# Patient Record
Sex: MALE | Race: WHITE | URBAN - METROPOLITAN AREA
[De-identification: names, ages, dates, MRNs, and addresses within clinical notes are randomized per-mention and may not be internally consistent; named-entity substitution may affect disease eponyms.]

---

## 2017-06-25 ENCOUNTER — ANESTHESIA - HEALTHEAST (OUTPATIENT)
Dept: SURGERY | Facility: CLINIC | Age: 22
End: 2017-06-25

## 2017-06-25 ENCOUNTER — SURGERY - HEALTHEAST (OUTPATIENT)
Dept: SURGERY | Facility: CLINIC | Age: 22
End: 2017-06-25

## 2017-06-25 ASSESSMENT — MIFFLIN-ST. JEOR
SCORE: 1803.19
SCORE: 1821.33

## 2017-07-09 ENCOUNTER — COMMUNICATION - HEALTHEAST (OUTPATIENT)
Dept: PEDIATRICS | Facility: CLINIC | Age: 22
End: 2017-07-09

## 2017-09-01 ENCOUNTER — AMBULATORY - HEALTHEAST (OUTPATIENT)
Dept: SURGERY | Facility: CLINIC | Age: 22
End: 2017-09-01

## 2017-09-13 ENCOUNTER — AMBULATORY - HEALTHEAST (OUTPATIENT)
Dept: SURGERY | Facility: CLINIC | Age: 22
End: 2017-09-13

## 2017-09-15 ENCOUNTER — RECORDS - HEALTHEAST (OUTPATIENT)
Dept: ADMINISTRATIVE | Facility: OTHER | Age: 22
End: 2017-09-15

## 2017-09-22 ENCOUNTER — OFFICE VISIT - HEALTHEAST (OUTPATIENT)
Dept: SURGERY | Facility: CLINIC | Age: 22
End: 2017-09-22

## 2017-09-22 DIAGNOSIS — M89.9 PUBIC BONE PAIN: ICD-10-CM

## 2017-09-22 DIAGNOSIS — R10.31 RIGHT LOWER QUADRANT ABDOMINAL PAIN: ICD-10-CM

## 2017-09-22 ASSESSMENT — MIFFLIN-ST. JEOR: SCORE: 1796.38

## 2017-09-28 ENCOUNTER — RECORDS - HEALTHEAST (OUTPATIENT)
Dept: ADMINISTRATIVE | Facility: OTHER | Age: 22
End: 2017-09-28

## 2017-10-02 ENCOUNTER — RECORDS - HEALTHEAST (OUTPATIENT)
Dept: ADMINISTRATIVE | Facility: OTHER | Age: 22
End: 2017-10-02

## 2017-10-02 ENCOUNTER — AMBULATORY - HEALTHEAST (OUTPATIENT)
Dept: SURGERY | Facility: CLINIC | Age: 22
End: 2017-10-02

## 2017-10-02 DIAGNOSIS — M54.50 CHRONIC LOW BACK PAIN WITHOUT SCIATICA, UNSPECIFIED BACK PAIN LATERALITY: ICD-10-CM

## 2017-10-02 DIAGNOSIS — G89.29 CHRONIC LOW BACK PAIN WITHOUT SCIATICA, UNSPECIFIED BACK PAIN LATERALITY: ICD-10-CM

## 2017-10-10 ENCOUNTER — RECORDS - HEALTHEAST (OUTPATIENT)
Dept: ADMINISTRATIVE | Facility: OTHER | Age: 22
End: 2017-10-10

## 2017-10-31 ENCOUNTER — RECORDS - HEALTHEAST (OUTPATIENT)
Dept: ADMINISTRATIVE | Facility: OTHER | Age: 22
End: 2017-10-31

## 2018-11-01 ENCOUNTER — TELEPHONE (OUTPATIENT)
Dept: GASTROENTEROLOGY | Facility: CLINIC | Age: 23
End: 2018-11-01

## 2018-11-02 NOTE — TELEPHONE ENCOUNTER
FUTURE VISIT INFORMATION      FUTURE VISIT INFORMATION:    Date: 11/5/18    Time:     [place] [facility] [place] INFORMATION:    Referring provider:  N/A    Referring providers clinic:  [facility]    Reason for visit/diagnosis: Diverticulitis    RECORDS REQUESTED FROM:         NOTES STATUS DETAILS   OFFICE NOTE from referring provider Care Everywhere    OFFICE NOTE from other specialist N/A    DISCHARGE SUMMARY from hospital Care Everywhere    OPERATIVE REPORT N/A    MEDICATION LIST Care Everywhere         ENDOSCOPY  N/A    COLONOSCOPY N/A    ERCP N/A    EUS N/A    STOOL TESTING N/A    PERTINENT LABS Care Everywhere    PATHOLOGY REPORTS (RELATED) Care Everywhere    IMAGING (CT, MRI, EGD) Care Everywhere

## 2018-11-05 ENCOUNTER — OFFICE VISIT (OUTPATIENT)
Dept: GASTROENTEROLOGY | Facility: CLINIC | Age: 23
End: 2018-11-05
Payer: COMMERCIAL

## 2018-11-05 ENCOUNTER — PRE VISIT (OUTPATIENT)
Dept: GASTROENTEROLOGY | Facility: CLINIC | Age: 23
End: 2018-11-05

## 2018-11-05 ENCOUNTER — TELEPHONE (OUTPATIENT)
Dept: GASTROENTEROLOGY | Facility: CLINIC | Age: 23
End: 2018-11-05

## 2018-11-05 VITALS
SYSTOLIC BLOOD PRESSURE: 142 MMHG | OXYGEN SATURATION: 99 % | WEIGHT: 165.5 LBS | DIASTOLIC BLOOD PRESSURE: 79 MMHG | TEMPERATURE: 97.7 F | BODY MASS INDEX: 21.24 KG/M2 | HEART RATE: 106 BPM | HEIGHT: 74 IN

## 2018-11-05 DIAGNOSIS — M89.9 PUBIC BONE PAIN: Primary | ICD-10-CM

## 2018-11-05 DIAGNOSIS — M89.9 PUBIC BONE PAIN: ICD-10-CM

## 2018-11-05 LAB
ALBUMIN SERPL-MCNC: 5.3 G/DL (ref 3.4–5)
ALBUMIN UR-MCNC: NEGATIVE MG/DL
ALP SERPL-CCNC: 54 U/L (ref 40–150)
ALT SERPL W P-5'-P-CCNC: 32 U/L (ref 0–70)
ANION GAP SERPL CALCULATED.3IONS-SCNC: 10 MMOL/L (ref 3–14)
APPEARANCE UR: CLEAR
AST SERPL W P-5'-P-CCNC: 19 U/L (ref 0–45)
BASOPHILS # BLD AUTO: 0 10E9/L (ref 0–0.2)
BASOPHILS NFR BLD AUTO: 0.2 %
BILIRUB SERPL-MCNC: 0.8 MG/DL (ref 0.2–1.3)
BILIRUB UR QL STRIP: NEGATIVE
BUN SERPL-MCNC: 14 MG/DL (ref 7–30)
CALCIUM SERPL-MCNC: 10 MG/DL (ref 8.5–10.1)
CHLORIDE SERPL-SCNC: 103 MMOL/L (ref 94–109)
CO2 SERPL-SCNC: 27 MMOL/L (ref 20–32)
COLOR UR AUTO: YELLOW
CREAT SERPL-MCNC: 0.94 MG/DL (ref 0.66–1.25)
CRP SERPL-MCNC: <2.9 MG/L (ref 0–8)
DIFFERENTIAL METHOD BLD: NORMAL
EOSINOPHIL # BLD AUTO: 0.1 10E9/L (ref 0–0.7)
EOSINOPHIL NFR BLD AUTO: 0.6 %
ERYTHROCYTE [DISTWIDTH] IN BLOOD BY AUTOMATED COUNT: 12.4 % (ref 10–15)
ERYTHROCYTE [SEDIMENTATION RATE] IN BLOOD BY WESTERGREN METHOD: 1 MM/H (ref 0–15)
GFR SERPL CREATININE-BSD FRML MDRD: >90 ML/MIN/1.7M2
GLUCOSE SERPL-MCNC: 84 MG/DL (ref 70–99)
GLUCOSE UR STRIP-MCNC: NEGATIVE MG/DL
HCT VFR BLD AUTO: 50.5 % (ref 40–53)
HGB BLD-MCNC: 17.2 G/DL (ref 13.3–17.7)
HGB UR QL STRIP: NEGATIVE
IMM GRANULOCYTES # BLD: 0 10E9/L (ref 0–0.4)
IMM GRANULOCYTES NFR BLD: 0.3 %
KETONES UR STRIP-MCNC: 20 MG/DL
LEUKOCYTE ESTERASE UR QL STRIP: NEGATIVE
LYMPHOCYTES # BLD AUTO: 1.5 10E9/L (ref 0.8–5.3)
LYMPHOCYTES NFR BLD AUTO: 15.4 %
MCH RBC QN AUTO: 30.1 PG (ref 26.5–33)
MCHC RBC AUTO-ENTMCNC: 34.1 G/DL (ref 31.5–36.5)
MCV RBC AUTO: 88 FL (ref 78–100)
MONOCYTES # BLD AUTO: 0.6 10E9/L (ref 0–1.3)
MONOCYTES NFR BLD AUTO: 6 %
MUCOUS THREADS #/AREA URNS LPF: PRESENT /LPF
NEUTROPHILS # BLD AUTO: 7.3 10E9/L (ref 1.6–8.3)
NEUTROPHILS NFR BLD AUTO: 77.5 %
NITRATE UR QL: NEGATIVE
NRBC # BLD AUTO: 0 10*3/UL
NRBC BLD AUTO-RTO: 0 /100
PH UR STRIP: 7 PH (ref 5–7)
PLATELET # BLD AUTO: 239 10E9/L (ref 150–450)
POTASSIUM SERPL-SCNC: 4.2 MMOL/L (ref 3.4–5.3)
PROT SERPL-MCNC: 8.8 G/DL (ref 6.8–8.8)
RBC # BLD AUTO: 5.71 10E12/L (ref 4.4–5.9)
RBC #/AREA URNS AUTO: <1 /HPF (ref 0–2)
SODIUM SERPL-SCNC: 139 MMOL/L (ref 133–144)
SOURCE: ABNORMAL
SP GR UR STRIP: 1.01 (ref 1–1.03)
UROBILINOGEN UR STRIP-MCNC: 0 MG/DL (ref 0–2)
WBC # BLD AUTO: 9.4 10E9/L (ref 4–11)
WBC #/AREA URNS AUTO: 1 /HPF (ref 0–5)

## 2018-11-05 ASSESSMENT — ENCOUNTER SYMPTOMS
WEIGHT GAIN: 0
WEIGHT LOSS: 1
ABDOMINAL PAIN: 1
LIGHT-HEADEDNESS: 0
ORTHOPNEA: 0
PANIC: 0
DEPRESSION: 1
NAUSEA: 0
HEARTBURN: 0
CONSTIPATION: 0
BLOOD IN STOOL: 0
INSOMNIA: 0
PALPITATIONS: 1
DIFFICULTY URINATING: 0
FLANK PAIN: 0
BOWEL INCONTINENCE: 0
HEMATURIA: 0
INCREASED ENERGY: 1
DIARRHEA: 0
DECREASED CONCENTRATION: 1
VOMITING: 0
RECTAL PAIN: 0
EXERCISE INTOLERANCE: 1
POLYDIPSIA: 0
SYNCOPE: 0
FATIGUE: 1
FEVER: 0
CHILLS: 0
DYSURIA: 1
NERVOUS/ANXIOUS: 1
POLYPHAGIA: 0
HALLUCINATIONS: 0
HYPOTENSION: 0
LEG PAIN: 0
NIGHT SWEATS: 1
ALTERED TEMPERATURE REGULATION: 0
BLOATING: 1
HYPERTENSION: 0
DECREASED APPETITE: 1
JAUNDICE: 0
SLEEP DISTURBANCES DUE TO BREATHING: 0

## 2018-11-05 ASSESSMENT — PAIN SCALES - GENERAL: PAINLEVEL: NO PAIN (0)

## 2018-11-05 NOTE — LETTER
Date:November 9, 2018      Patient was self referred, no letter generated. Do not send.        [facility] Information

## 2018-11-05 NOTE — LETTER
"11/5/2018       RE: Delvis Owusu  [address] [facility] [id number]     Dear Colleague,    Thank you for referring your patient, Delvis Owusu, to the [facility] GASTROENTEROLOGY AND IBD CLINIC at [facility]. Please see a copy of my visit note below.    GI CLINIC VISIT - NEW PATIENT    CC/REFERRING PROVIDER: Referred Self  REASON FOR CONSULTATION: Pubic symphysis pain    HPI: 23 year old male with PMH of appendicitis s/p appendectomy 6/2017, cecal inflammation of unclear etiology in 2017 (? Related to appendicitis vs diverticulitis) with reportedly normal colonoscopy 10/2017 (no records available) presenting for pain in pubic symphysis region.     He initially presented to [facility] in 6/2017 with R-sided abdominal pain. CT revealed cecal inflammation and possible appendicitis, so he underwent appendectomy 6/2017. Operative note indicated: \"inflammatory cecal mass which appeared like infectious etiology and not malignancy. The appendix was adhered to this and the tip was nearly fused to this mass proximally.\" and \"I once again evaluated the cecal mass and it appeared like an infectious mass almost as though something at perforated but sealed itself. It was unclear whether or not the appendix because this or vice versa.\" Post-operatively, he noted basically immediately after surgery he had a pressure sensation/discomfort over his pubic symphysis that has persistent since then for the past 1.5 years. In follow-up after surgery, he had recurrent hospitalizations for RLQ abdominal pain:   7/2017-CT with cecal inflammation and enlarged LNs  9/2017-CT with ascending colon diverticulitis  Following this, he had MRI pelvis 9/2017 which showed resolved pelvic free fluid and did not fully image the R colon inflammation. He says that he subsequently had colon 10/2017 by a surgeon at [facility] that he says was normal, however we do not have these " "records.     Regarding his current symptoms, he notes pain over the pubic symphysis since right after his surgery for the past 1.5 years. He says it is a \"pressure\" that is constant at a 2/10 and will flare to 5/10. It is worse with prolonged sitting and exercise. Notes it occasionally will radiate to inner thighs or testicles with prolonged sitting. No alleviating factors. He did PT for 4-5 weeks which didn't help, and tried OTC pain medications which didn't help. He hasn't seen anyone in the past year for evaluation of this. He also has rare LLQ and RLQ \"colon pains\" which occur up to 1x/day, are sharp, last from 5 mins up to 1 hour, and are worse with urination. He otherwise denies urinary symptoms (no burning, discharge, blood), no stool changes (1-2 formed brown BMs per day), N/V, fevers/chills, dys/odynophagia. Has lost 8-10 lbs in past year due to poor appetite.     ROS: 10pt ROS performed and otherwise negative.    PERTINENT PAST MEDICAL HISTORY:  As noted above.    PREVIOUS ABDOMINAL/GYNECOLOGIC SURGERIES:  As noted above.    PREVIOUS ENDOSCOPY:  Colon 10/2017 - reportedly normal, no records     PERTINENT MEDICATIONS:  None  Medications reviewed with patient today, see Medication List/Assessment for details.  No other NSAID/anticoagulation reported by patient.  No other OTC/herbal/supplements reported by patient.    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     FAMILY HISTORY:  No h/o colon cancer or IBD    PHYSICAL EXAMINATION:  Vitals reviewed  /79  Pulse 106  Temp 97.7  F (36.5  C) (Oral)  Ht 1.88 m (6' 2\")  Wt 75.1 kg (165 lb 8 oz)  SpO2 99%  BMI 21.25 kg/m2    Gen: aaox3, cooperative, pleasant, not diaphoretic, nad  HEENT: ncat, neck supple, no clad/sclad, normal op w/o ulcer/exudate, anicteric, mmm  Resp/CV without acute " findings, not dyspneic/tachycardic  Abd: soft, ND. Tender over pubic symphysis, worse with abdominal contraction (+Carnetts), near laparoscopic port site. Other port sites well-healed.   : No testicular masses or tenderness  Ext: no c/c/e  Skin: warm, perfused, no jaundice  Neuro: grossly intact    PERTINENT STUDIES Reviewed in EMR     ASSESSMENT/PLAN: 23 year old male with PMH of appendicitis s/p appendectomy 6/2017, cecal inflammation of unclear etiology in 2017 (? Related to appendicitis vs diverticulitis) with reportedly normal colonoscopy 10/2017 (no records available) presenting for pain in pubic symphysis region. Overall, the pain seems most likely related to appendectomy given started after surgery and no history of pain prior to this. Consider nerve injury from laparoscopic port, hernia 2/2 port site, or abdominal wall injury given worsening pain with exercise and abdominal flexion. He does have history of cecal inflammation with reportedly normal colon in 2017, however has intermittent bilateral lower quadrant pain without stool changes, so Crohn's disease also possible but less likely. Consider  cause as well given location of pain (cystitis, STD).   - CT abdm/pelvis to evaluate for hernia at port site or abdominal wall injury or bowel inflammation  - Repeat colonoscopy to evaluate for IBD, with specific focus on cecum/TI  - Check labs today - CBC, CMP, ESR, CRP  - Refer to pain clinic to be seen after CT/Colon to evaluate for trigger point injections for possible nerve injury from port site  - Check U/A and GC/Chlamydia, consider referral to urology if no source of pain identified with above workup    RTC 3 months, sooner if symptomatic.      Thank you for this consultation. It was a pleasure to participate in the care of this patient; please contact us with any further questions.    Discussed with Dr. Mamadou Mckeon MD  GI Fellow  p [phone]      ATTENDING ATTESTATION:     DATE SEEN:  11/5/2018    Patient was discussed, seen, and examined by me, Osmar Cedillo. The plan of care and pertinent data/imaging were also reviewed with the GI Fellow, Dr. Mckeon. Agree with the assessment and plan as delineated above.    Please contact me with any further questions.    Osmar Cedillo MD    [facility]  Division of Gastroenterology, Hepatology and Nutrition          Again, thank you for allowing me to participate in the care of your patient.      Sincerely,    José Luis Mckeon MD

## 2018-11-05 NOTE — MR AVS SNAPSHOT
After Visit Summary   11/5/2018    Delvis Owusu    MRN: [id number]           Patient Information     Date Of Birth          1995        Visit Information        Provider Department      11/5/2018 9:00 AM José Luis Mckeon MD [facility] Gastroenterology and IBD Clinic        Today's Diagnoses     Pubic bone pain    -  1      Care Instructions    - CT to evaluate for hernia or bowel inflammation  You are scheduled on  November 6   Check in time 1110   Nothing to eat or drink for 2 hours prior to exam    [facility]   First floor imaging     - Schedule colonoscopy  You are scheduled on November 21   Check in time 830   [facility] [facility]   [address] [facility]    You will be mailed the instructions  You will receive a call from a pre assessment nurse about one week    - Pain clinic after CT and colonoscopy      - Labs and urine today  Lab tests today at the 1st floor -- you will be notified of results by letter or my chart message in 7-10 days.  You will receive a phone call if more urgent follow up is needed.    - Return in 3 months   You will receive a call from Kaylynn chirinos  to set up appt with Herlinda Stoll     For questions regarding your care Monday through Friday, contact the RN GI care coordinator,  Call   [phone] . Your call will be  returned same day, or if consultation is needed with the provider, it may be following business day - or you may send a My Chart message.    For medication refills (prescribed by the GI clinic), contact your pharmacy.    For appointment rescheduling/cancellation, contact [phone]     After hours, or if you have an immediate GI concern and cannot wait for a return call, contact the GI Fellow at [phone] and select option #4.        For questions regarding your care Monday through Friday, contact the RN GI care coordinator,  Call   [phone] . Your call will be  returned same day, or if consultation is needed  with the provider, it may be following business day - or you may send a My Chart message.    For medication refills (prescribed by the GI clinic), contact your pharmacy.    For appointment rescheduling/cancellation, contact [phone]     After hours, or if you have an immediate GI concern and cannot wait for a return call, contact the GI Fellow at [phone] and select option #4.              Follow-ups after your visit        Additional Services     GASTROENTEROLOGY ADULT REF PROCEDURE ONLY [facility] [phone]       Last Lab Result: No results found for: CR  Body mass index is 21.25 kg/(m^2).     Needed:  No  Language:  English    Patient will be contacted to schedule procedure.     Please be aware that coverage of these services is subject to the terms and limitations of your health insurance plan.  Call member services at your health plan with any benefit or coverage questions.  Any procedures must be performed at a [place] facility OR coordinated by your clinic's referral office.    Please bring the following with you to your appointment:    (1) Any X-Rays, CTs or MRIs which have been performed.  Contact the facility where they were done to arrange for  prior to your scheduled appointment.    (2) List of current medications   (3) This referral request   (4) Any documents/labs given to you for this referral            [facility]FERRAL       Your provider has referred you to: [facility], located on the 4th Floor of the [facility] Please call [phone] to make an appointment.     Clinic is located at:   [facility] [address] [address]      Please complete the following questions:    Procedure/Referral: Pubic symphysis pain s/p appendectomy, potentially nerve injury or hernia    What is your diagnosis for the patient's pain? Abdominal wall pain s/p  appendectomy    What are your specific questions for the pain specialist? Would he be a candidate for trigger point injections?    Are there any red flags that may impact the assessment or management of the patient? None    REGARDING OPIOID MEDICATIONS:  The discussion of opioids management, appropriateness of therapy, and dosing will be discussed in patients being seen for evaluation.  The pain management clinics are not long-term prescribing clinics, with transition of prescribing of medications ultimately going back to the referring provider/PCP.  If prescribing is taken over at the pain clinic, it is in actively involved patients whom are appropriate for opioids, urine drug screening is completed, and long-term prescribing plan has been determined.  Therefore, we will not be automatically taking over prescribing at the patient's first visit.  Is this agreeable to you? agrees.    Please be aware that coverage of these services is subject to the terms and limitations of your health insurance plan.  Call member services at your health plan with any benefit or coverage questions.      Please bring the following with you to your appointment or have sent to the [facility]:    (1) Any X-Rays, CTs or MRIs which have been performed that are not in Epic.  Contact the facility where they were done to arrange for  prior to your scheduled appointment.  Any new CT, MRI or other procedures ordered by your specialist must be performed at a [facility] facility or coordinated by your clinic's referral office.    (2) List of current medications   (3) This referral request   (4) Any documents/labs given to you for this referral                  Follow-up notes from your care team     Return in about 3 months (around 2/5/2019).      Your next 10 appointments already scheduled     Nov 06, 2018 11:40 AM CST   CT ABDOMEN PELVIS W CONTRAST with UCCT2   [facility] ([facility])    [address]  [address] [facility] [address] [phone]           How do I prepare for my exam? (Food and drink instructions) To prepare: Do not eat or drink for 2 hours before your exam. If you need to take medicine, you may take it with small sips of water. (We may ask you to take liquid medicine as well.)  How do I prepare for my exam? (Other instructions) Please arrive 30 minutes early for your CT.  Once in the department you might be asked to drink water 15-20 minutes prior to your exam.  If indicated you may be asked to drink an oral contrast in advance of your CT.  If this is the case, the imaging team will let you know or be in contact with you prior to your appointment  Patients over 70 or patients with diabetes or kidney problems: If you haven t had a blood test (creatinine test) within the last 30 days, the Cardiologist/Radiologist may require you to get this test prior to your exam.  If you have diabetes:  Continue to take your metformin medication on the day of your exam  What should I wear: Please wear loose clothing, such as a sweat suit or jogging clothes. Avoid snaps, zippers and other metal. We may ask you to undress and put on a hospital gown.  How long does the exam take: Most scans take less than 20 minutes.  What should I bring: Please bring any scans or X-rays taken at other hospitals, if similar tests were done. Also bring a list of your medicines, including vitamins, minerals and over-the-counter drugs. It is safest to leave personal items at home.  Do I need a : No  is needed.  What do I need to tell my doctor? Be sure to tell your doctor: * If you have any allergies. * If there s any chance you are pregnant. * If you are breastfeeding.  What should I do after the exam: No restrictions, You may resume normal activities.  What is this test: A CT (computed tomography) scan is a series of pictures that allows us to look inside your body. The scanner creates images of the  body in cross sections, much like slices of bread. This helps us see any problems more clearly. You may receive contrast (X-ray dye) before or during your scan. You will be asked to drink the contrast.  Who should I call with questions: If you have any questions, please call the Imaging Department where you will have your exam. Directions, parking instructions, and other information is available on our website, [email or web address].            Nov 21, 2018  9:30 AM CST   Colonoscopy with Osmar Cedillo MD   [facility] ([facility])    [address] [place] [address] [address]  [facility] [id number] [phone]              Future tests that were ordered for you today     Open Future Orders        Priority Expected Expires Ordered    CRP inflammation Routine  11/5/2019 11/5/2018    Routine UA with micro reflex to culture Routine  11/5/2019 11/5/2018    Neisseria gonorrhoeae PCR Routine  11/6/2019 11/5/2018    Chlamydia trachomatis PCR Routine  11/6/2019 11/5/2018    CT Abdomen pelvis w contrast* Routine  11/5/2019 11/5/2018    CBC with platelets differential Routine  11/5/2019 11/5/2018    Comprehensive metabolic panel Routine  11/5/2019 11/5/2018    Erythrocyte sedimentation rate auto Routine  11/5/2019 11/5/2018            Who to contact     Please call your clinic at [phone] to:    Ask questions about your health    Make or cancel appointments    Discuss your medicines    Learn about your test results    Speak to your doctor            Additional Information About Your Visit        MyChart Information     [employer] is an electronic gateway that provides easy, online access to your medical records. With [employer], you can request a clinic appointment, read your test results, renew a prescription or communicate with your care team.     To sign up for [employer] visit the website at [email or web address]   You will be asked to enter the access code listed below, as well as  "some personal information. Please follow the directions to create your username and password.     Your access code is: [email or web address]  Expires: 2019  5:31 AM     Your access code will  in 90 days. If you need help or a new code, please contact your [facility] or call [phone] for assistance.        Care EveryWhere ID     This is your Care EveryWhere ID. This could be used by other organizations to access your [facility] medical records  [id number]        Your Vitals Were     Pulse Temperature Height Pulse Oximetry BMI (Body Mass Index)       106 97.7  F (36.5  C) (Oral) 1.88 m (6' 2\") 99% 21.25 kg/m2        Blood Pressure from Last 3 Encounters:   18 142/79    Weight from Last 3 Encounters:   18 75.1 kg (165 lb 8 oz)              We Performed the Following     GASTROENTEROLOGY ADULT REF PROCEDURE ONLY [facility] [phone]     [facility] REFERRAL        Primary Care Provider    None Specified       No primary provider on file.        Equal Access to Services     [facility]: Hadii amara diaz Sokirstie, wamickeyda lumegan, qaybta kaalmaterell mojica, mikayla sanchez . So [facility] [phone].    ATENCIÓN: Si habla español, tiene a mcgregor disposición servicios gratuitos de asistencia lingüística. Adrian al [phone].    We comply with applicable federal civil rights laws and [place] laws. We do not discriminate on the basis of race, color, national origin, age, disability, sex, sexual orientation, or gender identity.            Thank you!     Thank you for choosing [facility] GASTROENTEROLOGY AND IBD CLINIC  for your care. Our goal is always to provide you with excellent care. Hearing back from our patients is one way we can continue to improve our services. Please take a few minutes to complete the written survey that you may receive in the mail after your visit with us. Thank you!             Your " Updated Medication List - Protect others around you: Learn how to safely use, store and throw away your medicines at www.disposemymeds.org.      Notice  As of 11/5/2018 10:05 AM    You have not been prescribed any medications.

## 2018-11-05 NOTE — PROGRESS NOTES
"GI CLINIC VISIT - NEW PATIENT    CC/REFERRING PROVIDER: Referred Self  REASON FOR CONSULTATION: Pubic symphysis pain    HPI: 23 year old male with PMH of appendicitis s/p appendectomy 6/2017, cecal inflammation of unclear etiology in 2017 (? Related to appendicitis vs diverticulitis) with reportedly normal colonoscopy 10/2017 (no records available) presenting for pain in pubic symphysis region.     He initially presented to [facility] in 6/2017 with R-sided abdominal pain. CT revealed cecal inflammation and possible appendicitis, so he underwent appendectomy 6/2017. Operative note indicated: \"inflammatory cecal mass which appeared like infectious etiology and not malignancy. The appendix was adhered to this and the tip was nearly fused to this mass proximally.\" and \"I once again evaluated the cecal mass and it appeared like an infectious mass almost as though something at perforated but sealed itself. It was unclear whether or not the appendix because this or vice versa.\" Post-operatively, he noted basically immediately after surgery he had a pressure sensation/discomfort over his pubic symphysis that has persistent since then for the past 1.5 years. In follow-up after surgery, he had recurrent hospitalizations for RLQ abdominal pain:   7/2017-CT with cecal inflammation and enlarged LNs  9/2017-CT with ascending colon diverticulitis  Following this, he had MRI pelvis 9/2017 which showed resolved pelvic free fluid and did not fully image the R colon inflammation. He says that he subsequently had colon 10/2017 by a surgeon at [facility] that he says was normal, however we do not have these records.     Regarding his current symptoms, he notes pain over the pubic symphysis since right after his surgery for the past 1.5 years. He says it is a \"pressure\" that is constant at a 2/10 and will flare to 5/10. It is worse with prolonged sitting and exercise. Notes it occasionally will radiate to inner thighs or testicles with " "prolonged sitting. No alleviating factors. He did PT for 4-5 weeks which didn't help, and tried OTC pain medications which didn't help. He hasn't seen anyone in the past year for evaluation of this. He also has rare LLQ and RLQ \"colon pains\" which occur up to 1x/day, are sharp, last from 5 mins up to 1 hour, and are worse with urination. He otherwise denies urinary symptoms (no burning, discharge, blood), no stool changes (1-2 formed brown BMs per day), N/V, fevers/chills, dys/odynophagia. Has lost 8-10 lbs in past year due to poor appetite.     ROS: 10pt ROS performed and otherwise negative.    PERTINENT PAST MEDICAL HISTORY:  As noted above.    PREVIOUS ABDOMINAL/GYNECOLOGIC SURGERIES:  As noted above.    PREVIOUS ENDOSCOPY:  Colon 10/2017 - reportedly normal, no records     PERTINENT MEDICATIONS:  None  Medications reviewed with patient today, see Medication List/Assessment for details.  No other NSAID/anticoagulation reported by patient.  No other OTC/herbal/supplements reported by patient.    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     FAMILY HISTORY:  No h/o colon cancer or IBD    PHYSICAL EXAMINATION:  Vitals reviewed  /79  Pulse 106  Temp 97.7  F (36.5  C) (Oral)  Ht 1.88 m (6' 2\")  Wt 75.1 kg (165 lb 8 oz)  SpO2 99%  BMI 21.25 kg/m2    Gen: aaox3, cooperative, pleasant, not diaphoretic, nad  HEENT: ncat, neck supple, no clad/sclad, normal op w/o ulcer/exudate, anicteric, mmm  Resp/CV without acute findings, not dyspneic/tachycardic  Abd: soft, ND. Tender over pubic symphysis, worse with abdominal contraction (+Carnetts), near laparoscopic port site. Other port sites well-healed.   : No testicular masses or tenderness  Ext: no c/c/e  Skin: warm, perfused, no jaundice  Neuro: grossly intact    PERTINENT STUDIES Reviewed in EMR "     ASSESSMENT/PLAN: 23 year old male with PMH of appendicitis s/p appendectomy 6/2017, cecal inflammation of unclear etiology in 2017 (? Related to appendicitis vs diverticulitis) with reportedly normal colonoscopy 10/2017 (no records available) presenting for pain in pubic symphysis region. Overall, the pain seems most likely related to appendectomy given started after surgery and no history of pain prior to this. Consider nerve injury from laparoscopic port, hernia 2/2 port site, or abdominal wall injury given worsening pain with exercise and abdominal flexion. He does have history of cecal inflammation with reportedly normal colon in 2017, however has intermittent bilateral lower quadrant pain without stool changes, so Crohn's disease also possible but less likely. Consider  cause as well given location of pain (cystitis, STD).   - CT abdm/pelvis to evaluate for hernia at port site or abdominal wall injury or bowel inflammation  - Repeat colonoscopy to evaluate for IBD, with specific focus on cecum/TI  - Check labs today - CBC, CMP, ESR, CRP  - Refer to pain clinic to be seen after CT/Colon to evaluate for trigger point injections for possible nerve injury from port site  - Check U/A and GC/Chlamydia, consider referral to urology if no source of pain identified with above workup    RTC 3 months, sooner if symptomatic.      Thank you for this consultation. It was a pleasure to participate in the care of this patient; please contact us with any further questions.    Discussed with Dr. Mamadou Mckeon MD  GI Fellow  p [phone]      ATTENDING ATTESTATION:     DATE SEEN: 11/5/2018    Patient was discussed, seen, and examined by me, Osmar Cedillo. The plan of care and pertinent data/imaging were also reviewed with the GI Fellow, Dr. Mckeon. Agree with the assessment and plan as delineated above.    Please contact me with any further questions.    Osmar Cedillo MD    [facility]  [place]  Division of Gastroenterology, Hepatology and Nutrition

## 2018-11-05 NOTE — PATIENT INSTRUCTIONS
- CT to evaluate for hernia or bowel inflammation  You are scheduled on  November 6   Check in time 1110   Nothing to eat or drink for 2 hours prior to exam    [facility]   First floor imaging     - Schedule colonoscopy  You are scheduled on November 21   Check in time 830   [facility] [facility]   [facility]    You will be mailed the instructions  You will receive a call from a pre assessment nurse about one week    - Pain clinic after CT and colonoscopy      - Labs and urine today  Lab tests today at the 1st floor -- you will be notified of results by letter or my chart message in 7-10 days.  You will receive a phone call if more urgent follow up is needed.    - Return in 3 months   You will receive a call from Kaylynn chirinos  to set up appt with Herlinda Stoll     For questions regarding your care Monday through Friday, contact the RN GI care coordinator,  Call   [phone] . Your call will be  returned same day, or if consultation is needed with the provider, it may be following business day - or you may send a My Chart message.    For medication refills (prescribed by the GI clinic), contact your pharmacy.    For appointment rescheduling/cancellation, contact [phone]     After hours, or if you have an immediate GI concern and cannot wait for a return call, contact the GI Fellow at [phone] and select option #4.        For questions regarding your care Monday through Friday, contact the RN GI care coordinator,  Call   [phone] . Your call will be  returned same day, or if consultation is needed with the provider, it may be following business day - or you may send a My Chart message.    For medication refills (prescribed by the GI clinic), contact your pharmacy.    For appointment rescheduling/cancellation, contact [phone]     After hours, or if you have an immediate GI concern and cannot wait for a return call, contact the GI Fellow at [phone] and select option  #4.

## 2018-11-06 ENCOUNTER — RADIANT APPOINTMENT (OUTPATIENT)
Dept: CT IMAGING | Facility: CLINIC | Age: 23
End: 2018-11-06
Attending: INTERNAL MEDICINE
Payer: COMMERCIAL

## 2018-11-06 ENCOUNTER — TELEPHONE (OUTPATIENT)
Dept: GASTROENTEROLOGY | Facility: CLINIC | Age: 23
End: 2018-11-06

## 2018-11-06 DIAGNOSIS — M89.9 PUBIC BONE PAIN: ICD-10-CM

## 2018-11-06 LAB
C TRACH DNA SPEC QL NAA+PROBE: NEGATIVE
N GONORRHOEA DNA SPEC QL NAA+PROBE: NEGATIVE
SPECIMEN SOURCE: NORMAL
SPECIMEN SOURCE: NORMAL

## 2018-11-06 RX ORDER — IOPAMIDOL 755 MG/ML
101 INJECTION, SOLUTION INTRAVASCULAR ONCE
Status: COMPLETED | OUTPATIENT
Start: 2018-11-06 | End: 2018-11-06

## 2018-11-06 RX ADMIN — IOPAMIDOL 101 ML: 755 INJECTION, SOLUTION INTRAVASCULAR at 11:47

## 2018-11-06 NOTE — TELEPHONE ENCOUNTER
[place] [facility]    Phone Message    May a detailed message be left on voicemail: yes    Reason for Call: Other: Patient is calling to see if the Dr would see him after his CT. I advised the patient as far as I know the Dr will contact you with the results but he has some other questions as well. He stated he saw  but I also saw  on his chart. The  patient's CT is for 11/6. Please follow up with the patient. Thank you.    Action Taken: Message routed to:  [facility] FREDDIE

## 2018-11-06 NOTE — DISCHARGE INSTRUCTIONS

## 2018-11-06 NOTE — TELEPHONE ENCOUNTER
Pt stopped in clinic.   Discussed with Dr. Cedillo who is Dr. Mckeon's preceptor . Pt just had ct today need results. Then colonoscopy and results then will work on pain clinic referral.  That it is a step by step approach.  Informed pt that Dr. Cedillo will call pt with results.

## 2018-11-07 ENCOUNTER — TELEPHONE (OUTPATIENT)
Dept: GASTROENTEROLOGY | Facility: CLINIC | Age: 23
End: 2018-11-07

## 2018-11-07 NOTE — TELEPHONE ENCOUNTER
Patient called with questions regarding office visit from 11/5/18. Went over after visit summary patient had with him and plan for colonoscopy which is scheduled for 11/21/18, and that provider will go over scope once completed and decide next plan of care then. Patient stated understanding an thankful for assistance.

## 2018-11-08 ENCOUNTER — PATIENT OUTREACH (OUTPATIENT)
Dept: GASTROENTEROLOGY | Facility: CLINIC | Age: 23
End: 2018-11-08

## 2018-11-08 DIAGNOSIS — M89.9 PUBIC BONE PAIN: Primary | ICD-10-CM

## 2018-11-08 NOTE — PROGRESS NOTES
Pain clinic referral place and my chart message to pt.      CT looks ok. Labs ok.     He is scheduled for a colonoscopy.     I am ok with referring to pain clinic now to evaluate for trigger pt injection.     I spoke with him on the phone

## 2018-11-09 ENCOUNTER — TELEPHONE (OUTPATIENT)
Dept: GASTROENTEROLOGY | Facility: CLINIC | Age: 23
End: 2018-11-09

## 2018-11-09 NOTE — TELEPHONE ENCOUNTER
LVM for patient in regards to scheduling referral to Pain Clinic per Dr. Cedillo. Left call back number for patient to call and schedule appointment.

## 2018-11-12 ENCOUNTER — TELEPHONE (OUTPATIENT)
Dept: GASTROENTEROLOGY | Facility: CLINIC | Age: 23
End: 2018-11-12

## 2018-11-12 NOTE — TELEPHONE ENCOUNTER
LVM #2 for patient in regards to scheduling referral to Pain Clinic per Dr. Cedillo. Left call back number for patient to call and schedule appointment.

## 2018-11-13 ENCOUNTER — PATIENT OUTREACH (OUTPATIENT)
Dept: GASTROENTEROLOGY | Facility: CLINIC | Age: 23
End: 2018-11-13

## 2018-11-13 NOTE — PROGRESS NOTES
Patient lvm asking for  to elaborate on the ct result. Pt thinking this area is the source of his pain and where the pain is.informd pt that I would route to . Pt is okay if Dr.Howard callejas replies back via my chart.  colonoscopy scheduled on November 21.     Bones: Left L5 pars defects with grade 1 anterolisthesis at L5-S1.  Discogenic changes along the right superior endplate of L3 and midline  of the L5 inferior endplate. Degenerative changes of the pubic  symphysis.

## 2018-11-14 ENCOUNTER — TELEPHONE (OUTPATIENT)
Dept: GASTROENTEROLOGY | Facility: OUTPATIENT CENTER | Age: 23
End: 2018-11-14

## 2018-11-14 NOTE — TELEPHONE ENCOUNTER
Patient taking any blood thinners ? No     Heart disease ? Denies     Lung disease ?Denies       Sleep apnea ?Denies     Diabetic ?Denies     Kidney disease ?Denies     Dialysis ? N/a     Electronic implanted medical devices ?Denies     Are you taking any narcotic pain medication ? No   What is your daily dosage ?    PTSD ? N/a     Prep instructions reviewed with patient ? Patient declined review.  policy, MAC sedation plan reviewed. Advised patient to have someone stay with him post exam    Ph[facility]acy : [facility]    Indication for procedure :Pubic bone pain [M89.9]    Referring provider :José Luis Mckeon MD      Arrival Time : 8:30 AM

## 2018-11-15 ENCOUNTER — PATIENT OUTREACH (OUTPATIENT)
Dept: GASTROENTEROLOGY | Facility: CLINIC | Age: 23
End: 2018-11-15

## 2018-11-15 ENCOUNTER — TELEPHONE (OUTPATIENT)
Dept: RHEUMATOLOGY | Facility: CLINIC | Age: 23
End: 2018-11-15

## 2018-11-15 ENCOUNTER — TELEPHONE (OUTPATIENT)
Dept: GASTROENTEROLOGY | Facility: CLINIC | Age: 23
End: 2018-11-15

## 2018-11-15 DIAGNOSIS — M89.9 PUBIC BONE PAIN: Primary | ICD-10-CM

## 2018-11-15 NOTE — TELEPHONE ENCOUNTER
[facility] Call Center    Phone Message    May a detailed message be left on voicemail: yes    Reason for Call: Other: The pt is being referred for pubic bone pain, Bones: Left L5 pars defects with grade 1 anterolisthesis at L5-S1. Discogenic changes along the right superior endplate of L3 and midline of the L5 inferior endplate. Degenerative changes of the pubic lymphysis.   The GL state that bone pain is seen by Orthopedic so I explained that. The pt states he has already seen an orthopedic MD, that the pain is in that area but they don't know if it's bone pain. Could you look at this to see if we could see this pt? The pt is being referred by GI - Dr Cedillo. Please call the pt to discuss. Thanks.    Action Taken: Message routed to:  [facility] daniela rheum

## 2018-11-15 NOTE — TELEPHONE ENCOUNTER
Spoke to patient in regards to scheduling referral to Rheumatology and Pain Clinic. The patient stated that he would like to call and schedule the appointments himself to work around his schedule. Patient was given callback number to call and schedule appointments along with my callback in case there were any issues.

## 2018-11-15 NOTE — PROGRESS NOTES
Referral placed.  My chart message to pt about wait for rheumatology but be awhile.             I spoke to gabriela about degenerative changes of pelvic symphosis     Will refer to rheumatology for eval for rheumatologic causes.     Will have him seen pain clinic to discuss injection     Proceed with colonoscopy as scheduled     Please help arrange

## 2018-11-16 ENCOUNTER — CARE COORDINATION (OUTPATIENT)
Dept: GASTROENTEROLOGY | Facility: CLINIC | Age: 23
End: 2018-11-16
Payer: COMMERCIAL

## 2018-11-16 DIAGNOSIS — M89.9 PUBIC BONE PAIN: Primary | ICD-10-CM

## 2018-11-16 NOTE — PROGRESS NOTES
Discussed CT with patient.    NO residual inflammation in colon. No hernia.    Degenerative changes seen at pubic symphysis. This is odd in an otherwise young, healthy male. Unclear etiology but this could contribute to symptoms.    I did discuss with ortho - they recommend rheum evaluation.    Recs:  -ileo-colonoscopy to ensure no evidence of Crohn's - less likely given labs and CT showing resolution of symptoms (and outside colonoscopy that was reportedly unremarkable)  -referral to pain clinic to discuss injection into region of pain  -referral to rheumatology for further evaluation of possible rheumatologic process  -rheum labs    The patient agrees with these recommendations    Osmar Cedillo MD    [facility]  Division of Gastroenterology, Hepatology and Nutrition

## 2018-11-19 NOTE — TELEPHONE ENCOUNTER
Chart has been reviewed by the medical director who stated that he does not believe that we would be able to offer anything that would be of benefit to the patient and if it is desired either by the patient or the referring provider that the patient see a Rheumatologist, he recommends that the patient see a community Rheumatologist. A staff message of this information has been sent to the referring. Patient has been informed of this information via message.  Inez Shetty CMA  11/19/2018 3:19 PM

## 2018-11-21 ENCOUNTER — TRANSFERRED RECORDS (OUTPATIENT)
Dept: HEALTH INFORMATION MANAGEMENT | Facility: CLINIC | Age: 23
End: 2018-11-21

## 2018-11-21 ENCOUNTER — DOCUMENTATION ONLY (OUTPATIENT)
Dept: GASTROENTEROLOGY | Facility: OUTPATIENT CENTER | Age: 23
End: 2018-11-21
Payer: COMMERCIAL

## 2018-11-21 DIAGNOSIS — M89.9 PUBIC BONE PAIN: Primary | ICD-10-CM

## 2018-12-10 ENCOUNTER — OFFICE VISIT (OUTPATIENT)
Dept: SURGERY | Facility: CLINIC | Age: 23
End: 2018-12-10
Payer: COMMERCIAL

## 2018-12-10 ENCOUNTER — TELEPHONE (OUTPATIENT)
Dept: SURGERY | Facility: CLINIC | Age: 23
End: 2018-12-10

## 2018-12-10 VITALS
BODY MASS INDEX: 21.17 KG/M2 | HEART RATE: 85 BPM | WEIGHT: 165 LBS | SYSTOLIC BLOOD PRESSURE: 112 MMHG | DIASTOLIC BLOOD PRESSURE: 70 MMHG | HEIGHT: 74 IN

## 2018-12-10 DIAGNOSIS — M89.9 PUBIC BONE PAIN: Primary | ICD-10-CM

## 2018-12-10 PROCEDURE — 99204 OFFICE O/P NEW MOD 45 MIN: CPT | Performed by: SURGERY

## 2018-12-10 ASSESSMENT — MIFFLIN-ST. JEOR: SCORE: 1813.19

## 2018-12-10 NOTE — TELEPHONE ENCOUNTER
Type of surgery: Laparoscopic bilateral inguinal/sports hernia repair  Location of surgery: [facility]  Date and time of surgery: 1/2/19 at 10am  Surgeon: Dr. Maycol Olmedo  Pre-Op Appt Date: Patient to schedule  Post-Op Appt Date: Patient to schedule   Packet sent out: Yes  Pre-cert/Authorization completed:  Not Applicable  Date: 12/10/18

## 2018-12-19 NOTE — PROGRESS NOTES
"[facility]  Surgery Consultation    CONSULTATION REQUESTED BY: [facility]    HPI: This patient is a 23-year-old gentleman referred by the above-mentioned orthopedic group for evaluation regarding pubic bone pain.  He states that he relates these symptoms and pain back to undergoing a laparoscopic appendectomy in 2017.  He had been previously diagnosed with acute appendicitis and has subsequently also had episodes of right-sided diverticulitis.  He states after his surgical procedure he had developed acute pubic bone pain.  This is been ongoing for a year and a half.  He states that the pain is there all the time.  Sitting makes it worse.  He has not really been able to participate in any type of physical activity associated with this.  He has tried conservative measures and reports that he has done physical therapy with no improvement.  He is unable to perform any type of real aggressive core exercise.  He feels that this pain is significantly limiting his ability to perform any type of activity.    PMH:   has no past medical history on file.  Appendicitis, diverticulitis  PSH:    has no past surgical history on file.  Laparoscopic appendectomy  Social History:   reports that he has been smoking other.  he has never used smokeless tobacco.  Family History:  family history includes No Known Problems in his father, maternal grandfather, maternal grandmother, and mother.  Medications/Allergies: Home medications and allergies reviewed.    ROS:  The 10 point Review of Systems is negative other than noted in the HPI.    Physical Exam:  /70   Pulse 85   Ht 1.88 m (6' 2\")   Wt 74.8 kg (165 lb)   BMI 21.18 kg/m    GENERAL: Generally appears well.  Psych: Alert and Oriented.  Normal affect  Eyes: Sclera clear  Respiratory:  Lungs clear to ausculation bilaterally with good air excursion  Cardiovascular:  Regular Rate and Rhythm with no murmurs gallops or rubs, normal peripheral pulses  GI: " Abdomen Non Distended Moderate tenderness to palpation at the pubic symphysis  No hernias palpated..  Groin- I examined the patient in both the standing and supine positions. Right Groin- No hernia Palpated. Left Groin- No hernia Palpated. No scrotal or testicle abnormalities.  Lymphatic/Hematologic/Immune:  No femoral or cervical lymphadenopathy.  Integumentary:  No rashes  Neurological: grossly intact     All new lab and imaging data was reviewed.     Impression and Plan:  Patient is a 23 year old male with pubalgia of unclear etiology    PLAN: I discussed with him management options.  I suggested that possibly laparoscopic reinforcement may  be of benefit but overall my sense of his option for symptomatic improvement is may be 50% at best.  He certainly does not present as the typical athletic pubalgia type of patient.  We discussed in detail his management options.  He is interested in laparoscopic surgery.  I told him that there may be challenges in getting approval for this procedure but he would like to proceed.  I discussed the pathophysiology of hernias and options for repair including laparoscopic VS open.  The risks associated with the procedure including, but not limited to, recurrence, nerve entrapment or injury, persistence of pain, injury to the bowel/bladder, infertility, hematoma, mesh migration, mesh infection, MI, and PE were discussed with the patient. He indicated understanding of the discussion, asked appropriate questions, and provided consent. Signs and symptoms of incarceration were discussed. If these develop in the interim, he promises to call or go straight to the ER. I have provided the patient with an information pamphlet.      Thank you very much for this consult.    Maycol Olmedo M.D.  [facility]  [phone]    Please route or send letter to:  Primary Care Provider (PCP) and Referring Provider

## 2018-12-21 ENCOUNTER — TELEPHONE (OUTPATIENT)
Dept: SURGERY | Facility: CLINIC | Age: 23
End: 2018-12-21

## 2018-12-21 NOTE — TELEPHONE ENCOUNTER
12/21/18 12:33pm  Received message from [employer] office that Athletic Pubalgia is not a covered benefit with [facility] and therefore will not be covered by insurance.    Left message for patient requesting a call back to the financial counselors at [facility] to discuss payment options.  Also, left my phone number for additional questions.    12/27/18 12:54pm  Left another message for patient with the contact information for the financial counselors, also left my phone number to contact me with any questions regarding the uncovered benefit.

## 2018-12-31 ENCOUNTER — OFFICE VISIT (OUTPATIENT)
Dept: FAMILY MEDICINE | Facility: CLINIC | Age: 23
End: 2018-12-31
Payer: COMMERCIAL

## 2018-12-31 VITALS
HEIGHT: 74 IN | HEART RATE: 56 BPM | WEIGHT: 160 LBS | OXYGEN SATURATION: 99 % | RESPIRATION RATE: 14 BRPM | DIASTOLIC BLOOD PRESSURE: 70 MMHG | TEMPERATURE: 97.2 F | SYSTOLIC BLOOD PRESSURE: 110 MMHG | BODY MASS INDEX: 20.53 KG/M2

## 2018-12-31 DIAGNOSIS — Z01.818 PREOP GENERAL PHYSICAL EXAM: Primary | ICD-10-CM

## 2018-12-31 DIAGNOSIS — K40.90 INGUINAL HERNIA WITHOUT OBSTRUCTION OR GANGRENE, RECURRENCE NOT SPECIFIED, UNSPECIFIED LATERALITY: ICD-10-CM

## 2018-12-31 PROBLEM — F17.200 TOBACCO USE DISORDER: Status: ACTIVE | Noted: 2018-12-31

## 2018-12-31 PROCEDURE — 99214 OFFICE O/P EST MOD 30 MIN: CPT | Performed by: PHYSICIAN ASSISTANT

## 2018-12-31 ASSESSMENT — MIFFLIN-ST. JEOR: SCORE: 1790.51

## 2018-12-31 NOTE — PROGRESS NOTES
[facility]  [address]  [place] [address]  [phone]  Dept: [phone]    PRE-OP EVALUATION:  Today's date: 2018    Delvis Owusu (: 1995) presents for pre-operative evaluation assessment as requested by Dr. Maycol Olmedo.  He requires evaluation and anesthesia risk assessment prior to undergoing surgery/procedure for treatment of  LAPAROSCOPIC INGUINAL/ SPORTS HERNIA REPAIR WITH MESH.    Fax number for surgical facility:   Primary Physician: No Ref-Primary, Physician  Type of Anesthesia Anticipated: General    Patient has a Health Care Directive or Living Will:  NO    Preop Questions 2018   Who is doing your surgery? Dr. Rodriguez   What are you having done? Sports hernia repair   Date of Surgery/Procedure: 18   Facility or Hospital where procedure/surgery will be performed: [facility]   1.  Do you have a history of Heart attack, stroke, stent, coronary bypass surgery, or other heart surgery? No   2.  Do you ever have any pain or discomfort in your chest? No   3.  Do you have a history of  Heart Failure? No   4.   Are you troubled by shortness of breath when:  walking on a level surface, or up a slight hill, or at night? No   5.  Do you currently have a cold, bronchitis or other respiratory infection? No   6.  Do you have a cough, shortness of breath, or wheezing? No   7.  Do you sometimes get pains in the calves of your legs when you walk? No   8. Do you or anyone in your family have previous history of blood clots? No   9.  Do you or does anyone in your family have a serious bleeding problem such as prolonged bleeding following surgeries or cuts? No   10. Have you ever had problems with anemia or been told to take iron pills? No   11. Have you had any abnormal blood loss such as black, tarry or bloody stools? No   12. Have you ever had a blood transfusion? No   13. Have you or any of your relatives ever  had problems with anesthesia? No   14. Do you have sleep apnea, excessive snoring or daytime drowsiness? No   15. Do you have any prosthetic heart valves? No   16. Do you have prosthetic joints? No         HPI:     HPI related to upcoming procedure: Inguinal hernia requiring repair.      See problem list for active medical problems.  Problems all longstanding and stable, except as noted/documented.  See ROS for pertinent symptoms related to these conditions.                                                                                                                                                          .    MEDICAL HISTORY:     Patient Active Problem List    Diagnosis Date Noted     Tobacco use disorder 12/31/2018     Priority: Medium      History reviewed. No pertinent past medical history.  History reviewed. No pertinent surgical history.  No current outpatient medications on file.     OTC products: None, except as noted above    No Known Allergies   Latex Allergy: NO    Social History     Tobacco Use     Smoking status: Current Every Day Smoker     Types: Other     Smokeless tobacco: Never Used     Tobacco comment: e-cig   Substance Use Topics     Alcohol use: Yes     Comment: weekends, socially     History   Drug Use No       REVIEW OF SYSTEMS:   CONSTITUTIONAL: NEGATIVE for fever, chills, change in weight  INTEGUMENTARY/SKIN: NEGATIVE for worrisome rashes, moles or lesions  EYES: NEGATIVE for vision changes or irritation  ENT/MOUTH: NEGATIVE for ear, mouth and throat problems  RESP: NEGATIVE for significant cough or SOB  CV: NEGATIVE for chest pain, palpitations or peripheral edema  GI: NEGATIVE for nausea, abdominal pain, heartburn, or change in bowel habits  : NEGATIVE for frequency, dysuria, or hematuria  MUSCULOSKELETAL: NEGATIVE for significant arthralgias or myalgia  NEURO: NEGATIVE for weakness, dizziness or paresthesias  ENDOCRINE: NEGATIVE for temperature intolerance, skin/hair changes  HEME:  "NEGATIVE for bleeding problems  PSYCHIATRIC: NEGATIVE for changes in mood or affect    EXAM:   /70   Pulse 56   Temp 97.2  F (36.2  C) (Tympanic)   Resp 14   Ht 1.88 m (6' 2\")   Wt 72.6 kg (160 lb)   SpO2 99%   BMI 20.54 kg/m      GENERAL APPEARANCE: healthy, alert and no distress     EYES: EOMI,  PERRL     HENT: ear canals and TM's normal and nose and mouth without ulcers or lesions     NECK: no adenopathy, no asymmetry, masses, or scars and thyroid normal to palpation     RESP: lungs clear to auscultation - no rales, rhonchi or wheezes     CV: regular rates and rhythm, normal S1 S2, no S3 or S4 and no murmur, click or rub     ABDOMEN:  soft, nontender, no HSM or masses and bowel sounds normal     MS: extremities normal- no gross deformities noted, no evidence of inflammation in joints, FROM in all extremities.     SKIN: no suspicious lesions or rashes     NEURO: Normal strength and tone, sensory exam grossly normal, mentation intact and speech normal     PSYCH: mentation appears normal. and affect normal/bright     LYMPHATICS: No cervical adenopathy    DIAGNOSTICS:   No labs or EKG required for low risk surgery (cataract, skin procedure, breast biopsy, etc)    Recent Labs   Lab Test 11/05/18  1039   HGB 17.2         POTASSIUM 4.2   CR 0.94        IMPRESSION:   Reason for surgery/procedure: Inguinal Hernia  Diagnosis/reason for consult: Evaluation and anesthesia risk assessment prior to Hernia repair      The proposed surgical procedure is considered LOW risk.    REVISED CARDIAC RISK INDEX  The patient has the following serious cardiovascular risks for perioperative complications such as (MI, PE, VFib and 3  AV Block):  No serious cardiac risks  INTERPRETATION: 0 risks: Class I (very low risk - 0.4% complication rate)    The patient has the following additional risks for perioperative complications:  No identified additional risks      ICD-10-CM    1. Preop general physical exam Z01.818  "   2. Inguinal hernia without obstruction or gangrene, recurrence not specified, unspecified laterality K40.90        RECOMMENDATIONS:           APPROVAL GIVEN to proceed with proposed procedure, without further diagnostic evaluation       Signed Electronically by: Deepti Olsen PA-C    Copy of this evaluation report is provided to requesting physician.    [facility] Preop Guidelines    Revised Cardiac Risk Index

## 2018-12-31 NOTE — H&P (VIEW-ONLY)
[facility]  [address]  [place] [facility] [address]  [phone]  Dept: [phone]    PRE-OP EVALUATION:  Today's date: 2018    Delvis Owusu (: 1995) presents for pre-operative evaluation assessment as requested by Dr. Maycol Olmedo.  He requires evaluation and anesthesia risk assessment prior to undergoing surgery/procedure for treatment of  LAPAROSCOPIC INGUINAL/ SPORTS HERNIA REPAIR WITH MESH.    Fax number for surgical facility:   Primary Physician: No Ref-Primary, Physician  Type of Anesthesia Anticipated: General    Patient has a Health Care Directive or Living Will:  NO    Preop Questions 2018   Who is doing your surgery? Dr. Rodriguez   What are you having done? Sports hernia repair   Date of Surgery/Procedure: 18   Facility or Hospital where procedure/surgery will be performed: [facility]   1.  Do you have a history of Heart attack, stroke, stent, coronary bypass surgery, or other heart surgery? No   2.  Do you ever have any pain or discomfort in your chest? No   3.  Do you have a history of  Heart Failure? No   4.   Are you troubled by shortness of breath when:  walking on a level surface, or up a slight hill, or at night? No   5.  Do you currently have a cold, bronchitis or other respiratory infection? No   6.  Do you have a cough, shortness of breath, or wheezing? No   7.  Do you sometimes get pains in the calves of your legs when you walk? No   8. Do you or anyone in your family have previous history of blood clots? No   9.  Do you or does anyone in your family have a serious bleeding problem such as prolonged bleeding following surgeries or cuts? No   10. Have you ever had problems with anemia or been told to take iron pills? No   11. Have you had any abnormal blood loss such as black, tarry or bloody stools? No   12. Have you ever had a blood transfusion? No   13. Have you or any of your relatives ever  had problems with anesthesia? No   14. Do you have sleep apnea, excessive snoring or daytime drowsiness? No   15. Do you have any prosthetic heart valves? No   16. Do you have prosthetic joints? No         HPI:     HPI related to upcoming procedure: Inguinal hernia requiring repair.      See problem list for active medical problems.  Problems all longstanding and stable, except as noted/documented.  See ROS for pertinent symptoms related to these conditions.                                                                                                                                                          .    MEDICAL HISTORY:     Patient Active Problem List    Diagnosis Date Noted     Tobacco use disorder 12/31/2018     Priority: Medium      History reviewed. No pertinent past medical history.  History reviewed. No pertinent surgical history.  No current outpatient medications on file.     OTC products: None, except as noted above    No Known Allergies   Latex Allergy: NO    Social History     Tobacco Use     Smoking status: Current Every Day Smoker     Types: Other     Smokeless tobacco: Never Used     Tobacco comment: e-cig   Substance Use Topics     Alcohol use: Yes     Comment: weekends, socially     History   Drug Use No       REVIEW OF SYSTEMS:   CONSTITUTIONAL: NEGATIVE for fever, chills, change in weight  INTEGUMENTARY/SKIN: NEGATIVE for worrisome rashes, moles or lesions  EYES: NEGATIVE for vision changes or irritation  ENT/MOUTH: NEGATIVE for ear, mouth and throat problems  RESP: NEGATIVE for significant cough or SOB  CV: NEGATIVE for chest pain, palpitations or peripheral edema  GI: NEGATIVE for nausea, abdominal pain, heartburn, or change in bowel habits  : NEGATIVE for frequency, dysuria, or hematuria  MUSCULOSKELETAL: NEGATIVE for significant arthralgias or myalgia  NEURO: NEGATIVE for weakness, dizziness or paresthesias  ENDOCRINE: NEGATIVE for temperature intolerance, skin/hair changes  HEME:  "NEGATIVE for bleeding problems  PSYCHIATRIC: NEGATIVE for changes in mood or affect    EXAM:   /70   Pulse 56   Temp 97.2  F (36.2  C) (Tympanic)   Resp 14   Ht 1.88 m (6' 2\")   Wt 72.6 kg (160 lb)   SpO2 99%   BMI 20.54 kg/m      GENERAL APPEARANCE: healthy, alert and no distress     EYES: EOMI,  PERRL     HENT: ear canals and TM's normal and nose and mouth without ulcers or lesions     NECK: no adenopathy, no asymmetry, masses, or scars and thyroid normal to palpation     RESP: lungs clear to auscultation - no rales, rhonchi or wheezes     CV: regular rates and rhythm, normal S1 S2, no S3 or S4 and no murmur, click or rub     ABDOMEN:  soft, nontender, no HSM or masses and bowel sounds normal     MS: extremities normal- no gross deformities noted, no evidence of inflammation in joints, FROM in all extremities.     SKIN: no suspicious lesions or rashes     NEURO: Normal strength and tone, sensory exam grossly normal, mentation intact and speech normal     PSYCH: mentation appears normal. and affect normal/bright     LYMPHATICS: No cervical adenopathy    DIAGNOSTICS:   No labs or EKG required for low risk surgery (cataract, skin procedure, breast biopsy, etc)    Recent Labs   Lab Test 11/05/18  1039   HGB 17.2         POTASSIUM 4.2   CR 0.94        IMPRESSION:   Reason for surgery/procedure: Inguinal Hernia  Diagnosis/reason for consult: Evaluation and anesthesia risk assessment prior to Hernia repair      The proposed surgical procedure is considered LOW risk.    REVISED CARDIAC RISK INDEX  The patient has the following serious cardiovascular risks for perioperative complications such as (MI, PE, VFib and 3  AV Block):  No serious cardiac risks  INTERPRETATION: 0 risks: Class I (very low risk - 0.4% complication rate)    The patient has the following additional risks for perioperative complications:  No identified additional risks      ICD-10-CM    1. Preop general physical exam Z01.818  "   2. Inguinal hernia without obstruction or gangrene, recurrence not specified, unspecified laterality K40.90        RECOMMENDATIONS:           APPROVAL GIVEN to proceed with proposed procedure, without further diagnostic evaluation       Signed Electronically by: Deepti Olsen PA-C    Copy of this evaluation report is provided to requesting physician.    [facility] Preop Guidelines    Revised Cardiac Risk Index

## 2019-01-02 ENCOUNTER — ANESTHESIA EVENT (OUTPATIENT)
Dept: SURGERY | Facility: CLINIC | Age: 24
End: 2019-01-02
Payer: COMMERCIAL

## 2019-01-02 ENCOUNTER — APPOINTMENT (OUTPATIENT)
Dept: SURGERY | Facility: PHYSICIAN GROUP | Age: 24
End: 2019-01-02
Payer: COMMERCIAL

## 2019-01-02 ENCOUNTER — HOSPITAL ENCOUNTER (OUTPATIENT)
Facility: CLINIC | Age: 24
Discharge: HOME | End: 2019-01-02
Attending: SURGERY | Admitting: SURGERY
Payer: COMMERCIAL

## 2019-01-02 ENCOUNTER — ANESTHESIA (OUTPATIENT)
Dept: SURGERY | Facility: CLINIC | Age: 24
End: 2019-01-02
Payer: COMMERCIAL

## 2019-01-02 VITALS
BODY MASS INDEX: 20.39 KG/M2 | DIASTOLIC BLOOD PRESSURE: 69 MMHG | WEIGHT: 158.9 LBS | SYSTOLIC BLOOD PRESSURE: 115 MMHG | HEART RATE: 80 BPM | OXYGEN SATURATION: 98 % | RESPIRATION RATE: 16 BRPM | TEMPERATURE: 98 F | HEIGHT: 74 IN

## 2019-01-02 DIAGNOSIS — G89.18 POSTOPERATIVE PAIN: Primary | ICD-10-CM

## 2019-01-02 PROCEDURE — 25000128 H RX IP 250 OP 636: Performed by: SURGERY

## 2019-01-02 PROCEDURE — 25000125 ZZHC RX 250: Performed by: SURGERY

## 2019-01-02 PROCEDURE — 49659 UNLSTD LAPS PX HRNAP HRNRPHY: CPT | Mod: AS | Performed by: PHYSICIAN ASSISTANT

## 2019-01-02 PROCEDURE — 25000128 H RX IP 250 OP 636: Performed by: NURSE ANESTHETIST, CERTIFIED REGISTERED

## 2019-01-02 PROCEDURE — 49659 UNLSTD LAPS PX HRNAP HRNRPHY: CPT | Performed by: SURGERY

## 2019-01-02 PROCEDURE — 40000170 ZZH STATISTIC PRE-PROCEDURE ASSESSMENT II: Performed by: SURGERY

## 2019-01-02 PROCEDURE — 36000056 ZZH SURGERY LEVEL 3 1ST 30 MIN: Performed by: SURGERY

## 2019-01-02 PROCEDURE — 25000128 H RX IP 250 OP 636: Performed by: ANESTHESIOLOGY

## 2019-01-02 PROCEDURE — C1781 MESH (IMPLANTABLE): HCPCS | Performed by: SURGERY

## 2019-01-02 PROCEDURE — 37000008 ZZH ANESTHESIA TECHNICAL FEE, 1ST 30 MIN: Performed by: SURGERY

## 2019-01-02 PROCEDURE — 37000009 ZZH ANESTHESIA TECHNICAL FEE, EACH ADDTL 15 MIN: Performed by: SURGERY

## 2019-01-02 PROCEDURE — 25000125 ZZHC RX 250: Performed by: NURSE ANESTHETIST, CERTIFIED REGISTERED

## 2019-01-02 PROCEDURE — 71000027 ZZH RECOVERY PHASE 2 EACH 15 MINS: Performed by: SURGERY

## 2019-01-02 PROCEDURE — 71000012 ZZH RECOVERY PHASE 1 LEVEL 1 FIRST HR: Performed by: SURGERY

## 2019-01-02 PROCEDURE — 25000132 ZZH RX MED GY IP 250 OP 250 PS 637: Performed by: PHYSICIAN ASSISTANT

## 2019-01-02 PROCEDURE — 25000566 ZZH SEVOFLURANE, EA 15 MIN: Performed by: SURGERY

## 2019-01-02 PROCEDURE — 71000013 ZZH RECOVERY PHASE 1 LEVEL 1 EA ADDTL HR: Performed by: SURGERY

## 2019-01-02 PROCEDURE — 36000058 ZZH SURGERY LEVEL 3 EA 15 ADDTL MIN: Performed by: SURGERY

## 2019-01-02 PROCEDURE — 27210794 ZZH OR GENERAL SUPPLY STERILE: Performed by: SURGERY

## 2019-01-02 DEVICE — MESH PROGRIP LAPAROSCOPIC 5.9X3.9" PARIETEX SELF-FIX LPG1510: Type: IMPLANTABLE DEVICE | Site: INGUINAL | Status: FUNCTIONAL

## 2019-01-02 RX ORDER — LIDOCAINE HYDROCHLORIDE 20 MG/ML
INJECTION, SOLUTION INFILTRATION; PERINEURAL PRN
Status: DISCONTINUED | OUTPATIENT
Start: 2019-01-02 | End: 2019-01-02

## 2019-01-02 RX ORDER — SODIUM CHLORIDE, SODIUM LACTATE, POTASSIUM CHLORIDE, CALCIUM CHLORIDE 600; 310; 30; 20 MG/100ML; MG/100ML; MG/100ML; MG/100ML
INJECTION, SOLUTION INTRAVENOUS CONTINUOUS
Status: DISCONTINUED | OUTPATIENT
Start: 2019-01-02 | End: 2019-01-02 | Stop reason: HOSPADM

## 2019-01-02 RX ORDER — SODIUM CHLORIDE, SODIUM LACTATE, POTASSIUM CHLORIDE, CALCIUM CHLORIDE 600; 310; 30; 20 MG/100ML; MG/100ML; MG/100ML; MG/100ML
INJECTION, SOLUTION INTRAVENOUS CONTINUOUS PRN
Status: DISCONTINUED | OUTPATIENT
Start: 2019-01-02 | End: 2019-01-02

## 2019-01-02 RX ORDER — PROPOFOL 10 MG/ML
INJECTION, EMULSION INTRAVENOUS CONTINUOUS PRN
Status: DISCONTINUED | OUTPATIENT
Start: 2019-01-02 | End: 2019-01-02

## 2019-01-02 RX ORDER — NEOSTIGMINE METHYLSULFATE 1 MG/ML
VIAL (ML) INJECTION PRN
Status: DISCONTINUED | OUTPATIENT
Start: 2019-01-02 | End: 2019-01-02

## 2019-01-02 RX ORDER — PROPOFOL 10 MG/ML
INJECTION, EMULSION INTRAVENOUS PRN
Status: DISCONTINUED | OUTPATIENT
Start: 2019-01-02 | End: 2019-01-02

## 2019-01-02 RX ORDER — ACETAMINOPHEN 650 MG/1
650 SUPPOSITORY RECTAL EVERY 4 HOURS PRN
Status: DISCONTINUED | OUTPATIENT
Start: 2019-01-02 | End: 2019-01-02 | Stop reason: HOSPADM

## 2019-01-02 RX ORDER — ONDANSETRON 2 MG/ML
INJECTION INTRAMUSCULAR; INTRAVENOUS PRN
Status: DISCONTINUED | OUTPATIENT
Start: 2019-01-02 | End: 2019-01-02

## 2019-01-02 RX ORDER — ONDANSETRON 4 MG/1
4 TABLET, ORALLY DISINTEGRATING ORAL EVERY 30 MIN PRN
Status: DISCONTINUED | OUTPATIENT
Start: 2019-01-02 | End: 2019-01-02 | Stop reason: HOSPADM

## 2019-01-02 RX ORDER — NALOXONE HYDROCHLORIDE 0.4 MG/ML
.1-.4 INJECTION, SOLUTION INTRAMUSCULAR; INTRAVENOUS; SUBCUTANEOUS
Status: DISCONTINUED | OUTPATIENT
Start: 2019-01-02 | End: 2019-01-02 | Stop reason: HOSPADM

## 2019-01-02 RX ORDER — HYDROCODONE BITARTRATE AND ACETAMINOPHEN 5; 325 MG/1; MG/1
1-2 TABLET ORAL
Status: COMPLETED | OUTPATIENT
Start: 2019-01-02 | End: 2019-01-02

## 2019-01-02 RX ORDER — FENTANYL CITRATE 50 UG/ML
INJECTION, SOLUTION INTRAMUSCULAR; INTRAVENOUS PRN
Status: DISCONTINUED | OUTPATIENT
Start: 2019-01-02 | End: 2019-01-02

## 2019-01-02 RX ORDER — MAGNESIUM HYDROXIDE 1200 MG/15ML
LIQUID ORAL PRN
Status: DISCONTINUED | OUTPATIENT
Start: 2019-01-02 | End: 2019-01-02 | Stop reason: HOSPADM

## 2019-01-02 RX ORDER — BUPIVACAINE HYDROCHLORIDE AND EPINEPHRINE 5; 5 MG/ML; UG/ML
INJECTION, SOLUTION EPIDURAL; INTRACAUDAL; PERINEURAL
Status: DISCONTINUED
Start: 2019-01-02 | End: 2019-01-02 | Stop reason: HOSPADM

## 2019-01-02 RX ORDER — CEFAZOLIN SODIUM 2 G/100ML
2 INJECTION, SOLUTION INTRAVENOUS
Status: COMPLETED | OUTPATIENT
Start: 2019-01-02 | End: 2019-01-02

## 2019-01-02 RX ORDER — HYDROXYZINE HYDROCHLORIDE 50 MG/ML
50 INJECTION, SOLUTION INTRAMUSCULAR EVERY 6 HOURS PRN
Status: DISCONTINUED | OUTPATIENT
Start: 2019-01-02 | End: 2019-01-02 | Stop reason: HOSPADM

## 2019-01-02 RX ORDER — FENTANYL CITRATE 50 UG/ML
25-50 INJECTION, SOLUTION INTRAMUSCULAR; INTRAVENOUS
Status: DISCONTINUED | OUTPATIENT
Start: 2019-01-02 | End: 2019-01-02 | Stop reason: HOSPADM

## 2019-01-02 RX ORDER — HYDROCODONE BITARTRATE AND ACETAMINOPHEN 5; 325 MG/1; MG/1
1-2 TABLET ORAL EVERY 4 HOURS PRN
Qty: 20 TABLET | Refills: 0 | Status: SHIPPED | OUTPATIENT
Start: 2019-01-02 | End: 2019-01-05

## 2019-01-02 RX ORDER — KETOROLAC TROMETHAMINE 30 MG/ML
30 INJECTION, SOLUTION INTRAMUSCULAR; INTRAVENOUS EVERY 6 HOURS PRN
Status: DISCONTINUED | OUTPATIENT
Start: 2019-01-02 | End: 2019-01-02 | Stop reason: HOSPADM

## 2019-01-02 RX ORDER — LIDOCAINE HYDROCHLORIDE 10 MG/ML
INJECTION, SOLUTION EPIDURAL; INFILTRATION; INTRACAUDAL; PERINEURAL
Status: DISCONTINUED
Start: 2019-01-02 | End: 2019-01-02 | Stop reason: HOSPADM

## 2019-01-02 RX ORDER — CEFAZOLIN SODIUM 1 G/3ML
1 INJECTION, POWDER, FOR SOLUTION INTRAMUSCULAR; INTRAVENOUS SEE ADMIN INSTRUCTIONS
Status: DISCONTINUED | OUTPATIENT
Start: 2019-01-02 | End: 2019-01-02 | Stop reason: HOSPADM

## 2019-01-02 RX ORDER — GLYCOPYRROLATE 0.2 MG/ML
INJECTION, SOLUTION INTRAMUSCULAR; INTRAVENOUS PRN
Status: DISCONTINUED | OUTPATIENT
Start: 2019-01-02 | End: 2019-01-02

## 2019-01-02 RX ORDER — AMOXICILLIN 250 MG
1-2 CAPSULE ORAL 2 TIMES DAILY PRN
Qty: 30 TABLET | Refills: 0 | Status: SHIPPED | OUTPATIENT
Start: 2019-01-02 | End: 2019-01-21

## 2019-01-02 RX ORDER — ALBUTEROL SULFATE 0.83 MG/ML
2.5 SOLUTION RESPIRATORY (INHALATION) EVERY 4 HOURS PRN
Status: DISCONTINUED | OUTPATIENT
Start: 2019-01-02 | End: 2019-01-02 | Stop reason: HOSPADM

## 2019-01-02 RX ORDER — MEPERIDINE HYDROCHLORIDE 25 MG/ML
12.5 INJECTION INTRAMUSCULAR; INTRAVENOUS; SUBCUTANEOUS
Status: DISCONTINUED | OUTPATIENT
Start: 2019-01-02 | End: 2019-01-02 | Stop reason: HOSPADM

## 2019-01-02 RX ORDER — HYDROMORPHONE HYDROCHLORIDE 1 MG/ML
.3-.5 INJECTION, SOLUTION INTRAMUSCULAR; INTRAVENOUS; SUBCUTANEOUS EVERY 10 MIN PRN
Status: DISCONTINUED | OUTPATIENT
Start: 2019-01-02 | End: 2019-01-02 | Stop reason: HOSPADM

## 2019-01-02 RX ORDER — ONDANSETRON 2 MG/ML
4 INJECTION INTRAMUSCULAR; INTRAVENOUS EVERY 30 MIN PRN
Status: DISCONTINUED | OUTPATIENT
Start: 2019-01-02 | End: 2019-01-02 | Stop reason: HOSPADM

## 2019-01-02 RX ORDER — DEXAMETHASONE SODIUM PHOSPHATE 4 MG/ML
INJECTION, SOLUTION INTRA-ARTICULAR; INTRALESIONAL; INTRAMUSCULAR; INTRAVENOUS; SOFT TISSUE PRN
Status: DISCONTINUED | OUTPATIENT
Start: 2019-01-02 | End: 2019-01-02

## 2019-01-02 RX ADMIN — GLYCOPYRROLATE 0.4 MG: 0.2 INJECTION, SOLUTION INTRAMUSCULAR; INTRAVENOUS at 10:28

## 2019-01-02 RX ADMIN — Medication 0.5 MG: at 11:27

## 2019-01-02 RX ADMIN — DEXMEDETOMIDINE HYDROCHLORIDE 4 MCG: 100 INJECTION, SOLUTION INTRAVENOUS at 09:52

## 2019-01-02 RX ADMIN — LIDOCAINE HYDROCHLORIDE 40 MG: 20 INJECTION, SOLUTION INFILTRATION; PERINEURAL at 09:42

## 2019-01-02 RX ADMIN — CEFAZOLIN SODIUM 2 G: 2 INJECTION, SOLUTION INTRAVENOUS at 09:43

## 2019-01-02 RX ADMIN — ONDANSETRON 4 MG: 2 INJECTION INTRAMUSCULAR; INTRAVENOUS at 10:19

## 2019-01-02 RX ADMIN — HYDROCODONE BITARTRATE AND ACETAMINOPHEN 1 TABLET: 5; 325 TABLET ORAL at 11:54

## 2019-01-02 RX ADMIN — Medication 0.5 MG: at 11:54

## 2019-01-02 RX ADMIN — SODIUM CHLORIDE, POTASSIUM CHLORIDE, SODIUM LACTATE AND CALCIUM CHLORIDE: 600; 310; 30; 20 INJECTION, SOLUTION INTRAVENOUS at 09:41

## 2019-01-02 RX ADMIN — FENTANYL CITRATE 50 MCG: 50 INJECTION, SOLUTION INTRAMUSCULAR; INTRAVENOUS at 10:58

## 2019-01-02 RX ADMIN — Medication 0.5 MG: at 12:16

## 2019-01-02 RX ADMIN — NEOSTIGMINE METHYLSULFATE 3 MG: 1 INJECTION, SOLUTION INTRAVENOUS at 10:28

## 2019-01-02 RX ADMIN — FENTANYL CITRATE 50 MCG: 50 INJECTION, SOLUTION INTRAMUSCULAR; INTRAVENOUS at 10:49

## 2019-01-02 RX ADMIN — FENTANYL CITRATE 50 MCG: 50 INJECTION, SOLUTION INTRAMUSCULAR; INTRAVENOUS at 10:42

## 2019-01-02 RX ADMIN — DEXMEDETOMIDINE HYDROCHLORIDE 4 MCG: 100 INJECTION, SOLUTION INTRAVENOUS at 09:46

## 2019-01-02 RX ADMIN — FENTANYL CITRATE 50 MCG: 50 INJECTION, SOLUTION INTRAMUSCULAR; INTRAVENOUS at 09:42

## 2019-01-02 RX ADMIN — KETOROLAC TROMETHAMINE 30 MG: 30 INJECTION, SOLUTION INTRAMUSCULAR at 10:56

## 2019-01-02 RX ADMIN — HYDROXYZINE HYDROCHLORIDE 50 MG: 50 INJECTION, SOLUTION INTRAMUSCULAR at 12:16

## 2019-01-02 RX ADMIN — PROPOFOL 200 MCG/KG/MIN: 10 INJECTION, EMULSION INTRAVENOUS at 09:42

## 2019-01-02 RX ADMIN — ROCURONIUM BROMIDE 30 MG: 10 INJECTION INTRAVENOUS at 09:42

## 2019-01-02 RX ADMIN — MIDAZOLAM 2 MG: 1 INJECTION INTRAMUSCULAR; INTRAVENOUS at 09:42

## 2019-01-02 RX ADMIN — ROCURONIUM BROMIDE 5 MG: 10 INJECTION INTRAVENOUS at 10:02

## 2019-01-02 RX ADMIN — DEXMEDETOMIDINE HYDROCHLORIDE 4 MCG: 100 INJECTION, SOLUTION INTRAVENOUS at 09:49

## 2019-01-02 RX ADMIN — DEXAMETHASONE SODIUM PHOSPHATE 4 MG: 4 INJECTION, SOLUTION INTRA-ARTICULAR; INTRALESIONAL; INTRAMUSCULAR; INTRAVENOUS; SOFT TISSUE at 10:15

## 2019-01-02 RX ADMIN — PROPOFOL 200 MG: 10 INJECTION, EMULSION INTRAVENOUS at 09:42

## 2019-01-02 ASSESSMENT — ENCOUNTER SYMPTOMS: ORTHOPNEA: 0

## 2019-01-02 ASSESSMENT — LIFESTYLE VARIABLES: TOBACCO_USE: 1

## 2019-01-02 ASSESSMENT — MIFFLIN-ST. JEOR: SCORE: 1785.52

## 2019-01-02 NOTE — DISCHARGE INSTRUCTIONS
[facility]  Discharge Instructions: Post-Operative Laparoscopic Inguinal Hernia Repair  ACTIVITY  Take frequent, short walks and increase your activity gradually.    For the next 2 weeks you may perform low-impact straight-line activity (bike, elliptical)  Begin Day 1: isometric groin stretches (Butterfly stretch)  Begin week 3: jogging, weight lifting with lighter weights   Begin week 4: running, cutting, unlimited weight lifting. OK to start sports activities as tolerated  Expect to return to full activity between 4-6 weeks  You may climb stairs  You may drive without restrictions when you are not using any prescription pain medication and feel comfortable in a car.  You may return to work/school when you are comfortable without any prescription pain medication.      WOUND CARE    You may remove your outer dressing or Band-Aids and shower 48 hours after the surgery.  Pat your incisions dry and leave them open to air.  Re-apply dressing (Band-Aids or gauze/tape) as needed for comfort or drainage.    You may have steri-strips (looks like white tape) on your incision.  You may peel off the steri-strips 2 weeks after your surgery if they have not peeled off on their own.     Do not soak your incisions in a tub or pool for 2 weeks.     Do not apply any lotions, creams, or ointments to your incisions.    A ridge under your incisions is normal and will gradually resolve.    DIET    Start with liquids, then gradually resume your regular diet as tolerated.     Drink plenty of fluids to stay hydrated.    PAIN    Expect some tenderness and discomfort at the incision site(s).  Use the prescribed pain medication at your discretion.  Expect gradual resolution of your pain over several days.    You may take ibuprofen with food (unless you have been told not to) instead of or in addition to your prescribed pain medication.  If you are taking Norco or Percocet, do not take any additional  acetaminophen/APAP/Tylenol.    Do not drink alcohol or drive while you are taking pain medications.    You may apply ice to your incisions in 20 minute intervals as needed for the next 48 hours.  After that time, consider switching to heat if you prefer.    EXPECTATIONS    You may notice air in your scrotum and/or penis after the surgery.  This is due to the gas used during the surgery.  You can expect some swelling and bruising involving the scrotum and/or penis as well as numbness.  These symptoms are expected and should gradually resolve in the next few days.  You may use ice to help with the swelling and try placing a rolled hand towel below your scrotum to help alleviate scrotal discomfort.  If you develop significant testicular or penile pain, please call our office and speak with a nurse.    Pain medications can cause constipation.  Limit use when possible.  Take over the counter stool softener/stimulant, such as Colace or Senna, 1-2 times a day with plenty of water.  You may take a mild over the counter laxative, such as Miralax or a suppository, as needed.  You may take 1 oz. (2 tablespoons) Milk of Magnesia the evening following surgery to encourage bowel movement.  You may discontinue these medications once you are having regular bowel movements and/or are no longer taking your narcotic pain medication.     You may have shoulder or upper back discomfort due to the gas used in surgery.  This is temporary and should resolve in 48-72 hours.  Short, frequent walks may help with this.    FOLLOW UP    Our office will contact you in approximately 2 weeks to check on your progress and answer any questions you may have.  If any concerns are identified over the phone, we will help you make an appointment to see a provider.     Please call our office at [phone] to schedule an appointment with Dr. Olmedo in 4-6 weeks for recheck.     If you have not received a phone call, have any questions or concerns, or would  like to be seen sooner, please call us at [phone] and ask to speak with our nurse.  We are located at [address] [place] [address] [address]  [place] [address].    CALL OUR OFFICE [phone] IF YOU HAVE:     Chills or fever above 101 F.    Increased redness, warmth, or drainage at your incisions.    Significant bleeding.    Pain not relieved by your pain medication or rest.    Increasing pain after the first 48 hours.    Any other concerns or questions.      Same Day Surgery Discharge Instructions for  Sedation and General Anesthesia       It's not unusual to feel dizzy, light-headed or faint for up to 24 hours after surgery or while taking pain medication.  If you have these symptoms: sit for a few minutes before standing and have someone assist you when you get up to walk or use the bathroom.      You should rest and relax for the next 24 hours. We recommend you make arrangements to have an adult stay with you for at least 24 hours after your discharge.  Avoid hazardous and strenuous activity.      DO NOT DRIVE any vehicle or operate mechanical equipment for 24 hours following the end of your surgery.  Even though you may feel normal, your reactions may be affected by the medication you have received.      Do not drink alcoholic beverages for 24 hours following surgery.       Slowly progress to your regular diet as you feel able. It's not unusual to feel nauseated and/or vomit after receiving anesthesia.  If you develop these symptoms, drink clear liquids (apple juice, ginger ale, broth, 7-up, etc. ) until you feel better.  If your nausea and vomiting persists for 24 hours, please notify your surgeon.        All narcotic pain medications, along with inactivity and anesthesia, can cause constipation. Drinking plenty of liquids and increasing fiber intake will help.      For any questions of a medical nature, call your surgeon.      Do not make important decisions for 24 hours.      If you had general  anesthesia, you may have a sore throat for a couple of days related to the breathing tube used during surgery.  You may use Cepacol lozenges to help with this discomfort.  If it worsens or if you develop a fever, contact your surgeon.       If you feel your pain is not well managed with the pain medications prescribed by your surgeon, please contact your surgeon's office to let them know so they can address your concerns.       Today you received Toradol, an antiinflammatory medication similar to Ibuprofen.  You should not take other antiinflammatory medication, such as Ibuprofen, Motrin, Advil, Aleve, Naprosyn, etc until 5 :00 p.m.

## 2019-01-02 NOTE — ANESTHESIA CARE TRANSFER NOTE
Patient: Delvis Owusu    Procedure(s):  LAPAROSCOPIC INGUINAL/ SPORTS HERNIA REPAIR WITH MESH ;    Diagnosis: ATHLETIC PUBALCIA  Diagnosis Additional Information: No value filed.    Anesthesia Type:   General, ETT     Note:  Airway :Face Mask  Patient transferred to:PACU  Comments: 1039:  To par responsive, denitiiton unchanged from pre-op.Handoff Report: Identifed the Patient, Identified the Reponsible Provider, Reviewed the pertinent medical history, Discussed the surgical course, Reviewed Intra-OP anesthesia mangement and issues during anesthesia, Set expectations for post-procedure period and Allowed opportunity for questions and acknowledgement of understanding      Vitals: (Last set prior to Anesthesia Care Transfer)    CRNA VITALS  1/2/2019 1008 - 1/2/2019 1038      1/2/2019             Pulse:  78    Ht Rate:  77    SpO2:  100 %    Resp Rate (observed):  26    Resp Rate (set):  10                Electronically Signed By: EMELY Zavala CRNA  January 2, 2019  10:38 AM

## 2019-01-02 NOTE — BRIEF OP NOTE
[facility]    Brief Operative Note    Pre-operative diagnosis: ATHLETIC PUBALCIA  Post-operative diagnosis Same    Procedure: Procedure(s):  LAPAROSCOPIC BILATERAL INGUINAL HERNIA REPAIR WITH MESH    Surgeon: Surgeon(s) and Role:     * Maycol Olmedo MD - Primary     * Louann Ramachandran PA-C - Assisting     Anesthesia: General     Estimated blood loss: 10 mL      Findings:   As above. [id number] progrip mesh placed bilateral groins. No immediate complications. See operative report for full details.         Louann Ramachandran PA-C  [facility]  [phone]

## 2019-01-02 NOTE — ANESTHESIA PREPROCEDURE EVALUATION
Anesthesia Pre-Procedure Evaluation    Patient: Delvis Owusu   MRN: [id number] : 1995          Preoperative Diagnosis: ATHLETIC PUBALCIA    Procedure(s):  LAPAROSCOPIC INGUINAL/ SPORTS HERNIA REPAIR WITH MESH ;    History reviewed. No pertinent past medical history.  Past Surgical History:   Procedure Laterality Date     APPENDECTOMY       ORTHOPEDIC SURGERY Right     ring finger fused       Anesthesia Evaluation     . Pt has had prior anesthetic.            ROS/MED HX    ENT/Pulmonary: Comment: RAD as child     (+)tobacco use, Current use , . .   (-) asthma and sleep apnea   Neurologic:       Cardiovascular:        (-) ESTES, orthopnea/PND and syncope   METS/Exercise Tolerance:  >4 METS   Hematologic:         Musculoskeletal:         GI/Hepatic: Comment: Diverticulosis        (-) GERD   Renal/Genitourinary:         Endo:         Psychiatric:         Infectious Disease:         Malignancy:         Other:                          Physical Exam  Normal systems: dental    Airway   Mallampati: II  TM distance: >3 FB  Neck ROM: full    Dental     Cardiovascular   Rhythm and rate: regular      Pulmonary    breath sounds clear to auscultation            Lab Results   Component Value Date    WBC 9.4 2018    HGB 17.2 2018    HCT 50.5 2018     2018    CRP <2.9 2018    SED 1 2018     2018    POTASSIUM 4.2 2018    CHLORIDE 103 2018    CO2 27 2018    BUN 14 2018    CR 0.94 2018    GLC 84 2018    LOTTIE 10.0 2018    ALBUMIN 5.3 (H) 2018    PROTTOTAL 8.8 2018    ALT 32 2018    AST 19 2018    ALKPHOS 54 2018    BILITOTAL 0.8 2018       Preop Vitals  BP Readings from Last 3 Encounters:   19 134/77   18 110/70   12/10/18 112/70    Pulse Readings from Last 3 Encounters:   19 81   18 56   12/10/18 85      Resp Readings from Last 3 Encounters:   19 16  "  12/31/18 14    SpO2 Readings from Last 3 Encounters:   01/02/19 98%   12/31/18 99%   11/05/18 99%      Temp Readings from Last 1 Encounters:   01/02/19 36.9  C (98.4  F) (Oral)    Ht Readings from Last 1 Encounters:   01/02/19 1.88 m (6' 2\")      Wt Readings from Last 1 Encounters:   01/02/19 72.1 kg (158 lb 14.4 oz)    Estimated body mass index is 20.4 kg/m  as calculated from the following:    Height as of this encounter: 1.88 m (6' 2\").    Weight as of this encounter: 72.1 kg (158 lb 14.4 oz).     Procedure: Procedure(s):  LAPAROSCOPIC INGUINAL/ SPORTS HERNIA REPAIR WITH MESH ;  Preop diagnosis: ATHLETIC PUBALCIA    No Known Allergies  History reviewed. No pertinent past medical history.  Past Surgical History:   Procedure Laterality Date     APPENDECTOMY       ORTHOPEDIC SURGERY Right     ring finger fused     Social History     Tobacco Use     Smoking status: Current Every Day Smoker     Types: Other     Smokeless tobacco: Never Used     Tobacco comment: e-cig   Substance Use Topics     Alcohol use: Yes     Comment: weekends, socially     Prior to Admission medications    Medication Sig Start Date End Date Taking? Authorizing Provider   HYDROcodone-acetaminophen (NORCO) 5-325 MG tablet Take 1-2 tablets by mouth every 4 hours as needed for moderate to severe pain 1/2/19 1/5/19 Yes Louann Ramachandran PA-C   senna-docusate (SENOKOT-S/PERICOLACE) 8.6-50 MG tablet Take 1-2 tablets by mouth 2 times daily as needed for constipation (While on oral opioids.) 1/2/19 2/1/19 Yes Louann Ramachandran PA-C     Current Facility-Administered Medications Ordered in Epic   Medication Dose Route Frequency Last Rate Last Dose     bupivacaine 0.5% - EPINEPHrine 1:200,000 (PF) 0.5% -[id number] injection             ceFAZolin (ANCEF) 1 g vial to attach to  ml bag for ADULT or 50 ml bag for PEDS  1 g Intravenous See Admin Instructions         ceFAZolin (ANCEF) intermittent infusion 2 g in 100 mL dextrose PRE-MIX  2 g " Intravenous Pre-Op/Pre-procedure x 1 dose         lidocaine (PF) (XYLOCAINE) 1 % injection             Current Outpatient Medications Ordered in Epic   Medication     HYDROcodone-acetaminophen (NORCO) 5-325 MG tablet     senna-docusate (SENOKOT-S/PERICOLACE) 8.6-50 MG tablet       Wt Readings from Last 1 Encounters:   01/02/19 72.1 kg (158 lb 14.4 oz)     Temp Readings from Last 1 Encounters:   01/02/19 36.9  C (98.4  F) (Oral)     BP Readings from Last 6 Encounters:   01/02/19 134/77   12/31/18 110/70   12/10/18 112/70   11/05/18 142/79     Pulse Readings from Last 4 Encounters:   01/02/19 81   12/31/18 56   12/10/18 85   11/05/18 106     Resp Readings from Last 1 Encounters:   01/02/19 16   @LASTSAO2(1)@  Recent Labs   Lab Test 11/05/18  1039      POTASSIUM 4.2   CHLORIDE 103   CO2 27   ANIONGAP 10   GLC 84   BUN 14   CR 0.94   LOTTIE 10.0     Recent Labs   Lab Test 11/05/18  1039   AST 19   ALT 32   ALKPHOS 54   BILITOTAL 0.8     Recent Labs   Lab Test 11/05/18  1039   WBC 9.4   HGB 17.2        No results for input(s): ABO, RH in the last 86371 hours.  No results for input(s): INR, PTT in the last 09167 hours.   No results for input(s): TROPI in the last 12699 hours.  No results for input(s): PH, PCO2, PO2, HCO3 in the last 84646 hours.  No results for input(s): HCG in the last 39695 hours.  No results found for this or any previous visit (from the past 744 hour(s)).    RECENT LABS:   ECG:   ECHO:     Anesthesia Plan      History & Physical Review  History and physical reviewed and following examination; no interval change.    ASA Status:  2 .        Plan for General and ETT   PONV prophylaxis:  Ondansetron (or other 5HT-3) and Dexamethasone or Solumedrol  Propofol infusion      Postoperative Care      Consents  Anesthetic plan, risks, benefits and alternatives discussed with:  Patient..                 Zuleika Diaz MD

## 2019-01-02 NOTE — ANESTHESIA POSTPROCEDURE EVALUATION
Patient: Delvis Owusu    Procedure(s):  LAPAROSCOPIC INGUINAL/ SPORTS HERNIA REPAIR WITH MESH ;    Diagnosis:ATHLETIC PUBALCIA  Diagnosis Additional Information: No value filed.    Anesthesia Type:  General, ETT    Note:  Anesthesia Post Evaluation    Patient location during evaluation: PACU  Patient participation: Able to fully participate in evaluation  Level of consciousness: awake  Pain management: adequate  Airway patency: patent  Cardiovascular status: acceptable  Respiratory status: acceptable  Hydration status: acceptable  PONV: controlled     Anesthetic complications: None          Last vitals:  Vitals:    01/02/19 1245 01/02/19 1300 01/02/19 1338   BP: 112/56 112/67 115/69   Pulse: (!) 47 80    Resp: 15 15 16   Temp:      SpO2: 96% 96% 98%         Electronically Signed By: Zuleika Diaz MD  January 2, 2019  2:35 PM

## 2019-01-14 ENCOUNTER — TELEPHONE (OUTPATIENT)
Dept: SURGERY | Facility: CLINIC | Age: 24
End: 2019-01-14

## 2019-01-21 ENCOUNTER — OFFICE VISIT (OUTPATIENT)
Dept: SURGERY | Facility: CLINIC | Age: 24
End: 2019-01-21
Payer: COMMERCIAL

## 2019-01-21 DIAGNOSIS — Z09 SURGERY FOLLOW-UP EXAMINATION: Primary | ICD-10-CM

## 2019-01-21 PROCEDURE — 99024 POSTOP FOLLOW-UP VISIT: CPT | Performed by: SURGERY

## 2019-01-21 NOTE — PROGRESS NOTES
Patient presents in follow-up after recent laparoscopic bilateral sports hernia repair secondary to intractable pubic bone pain.  He reports that the first several days were quite painful but he notes improvement since.  He still has some discomfort with coughing and sneezing as well as irritation with urinating.  Pain seems mostly around the pubic bone still.  He is unclear as to whether or not this is improved from preoperative.  He said no fevers or chills.  Reports normal bowel function.    On examination: Incisions are well-healed.  There is no evidence of infection.    We discussed ongoing rehabilitation.  I provided him with my rehab handout and he is going to start physical therapy.  I recommend that he go once a week for 6 weeks and I would like to see him back in 2 months.  Questions were answered to his satisfaction.    Please route or send letter to:  Primary Care Provider (PCP)

## 2019-09-29 ENCOUNTER — HEALTH MAINTENANCE LETTER (OUTPATIENT)
Age: 24
End: 2019-09-29

## 2020-03-24 ENCOUNTER — NURSE TRIAGE (OUTPATIENT)
Dept: NURSING | Facility: CLINIC | Age: 25
End: 2020-03-24

## 2020-03-24 ENCOUNTER — COMMUNICATION - HEALTHEAST (OUTPATIENT)
Dept: SCHEDULING | Facility: CLINIC | Age: 25
End: 2020-03-24

## 2020-03-24 NOTE — TELEPHONE ENCOUNTER
Patient calling. States he got up during the night and had a diarrhea stool and since then has been severely nauseated.    States he has had diarrhea 10-12 times this morning since getting up. Has not had anything to eat but has had water and gatorade to drink.    For the past few times, he has been passing all blood in the diarrhea stools.    Protocol and care advice reviewed  Caller states understanding of the recommended disposition. Will got to ED for evaluation of rectal bleeding.    Advised to call back if further questions or concerns      Reason for Disposition    Bloody, black, or tarry bowel movements    Additional Information    Negative: Shock suspected (e.g., cold/pale/clammy skin, too weak to stand, low BP, rapid pulse)    Negative: Difficult to awaken or acting confused (e.g., disoriented, slurred speech)    Negative: Sounds like a life-threatening emergency to the triager    Negative: SEVERE abdominal pain and age > 60    Negative: SEVERE abdominal pain (e.g., excruciating) and present > 1 hour    Protocols used: DIARRHEA-A-OH

## 2020-06-16 NOTE — TELEPHONE ENCOUNTER
Attempted to reach pt. Patient x2 id verified. Notified result to pt, verbalized understanding. [facility]  Post op call note  January 14, 2019       Delvis Owusu was called for an update regarding his recovery. He underwent laparoscopic bilateral inguinal hernia repair with Dr. Olmedo on 1/2/19.  There was no answer and a message was left encouraging him to call back to provide an update or with any questions. I also informed him that he should call to schedule a follow-up appointment with Dr. Olmedo in 2-3 weeks.        Louann Ramachandran PA-C  [facility]  [phone]

## 2021-01-14 ENCOUNTER — HEALTH MAINTENANCE LETTER (OUTPATIENT)
Age: 26
End: 2021-01-14

## 2021-01-28 NOTE — OP NOTE
General Surgery Operative Note   PREOPERATIVE DIAGNOSIS: pubalgia, chronic left groin pain  POSTOPERATIVE DIAGNOSIS: Same   PROCEDURE: Laparoscopic bilateral sports hernia repair   ANESTHESIA: General.   SURGEON: Maycol Olmedo M.D.   ASSISTANT: Louann Ramachandran PA-C, Physician assistant first assistant was necessary during the performance of this procedure for expertise in patient positioning, prepping, draping, trocar placement, camera management, retraction and exposure, and suctioning.  INDICATIONS: This patient presented to my office with chronic left groin pain as well as significant pubalgia related back to having undergone laparoscopic appendectomy.   The patient also had pain with aggressive and strenuous physical activity within the left inguinal canal.I discussed with him that his symptoms were not completely consistent with athletic pubalgia or sports hernia.  We had an exhaustive discussion regarding his management options.  Surgical options were thoroughly discussed. The surgery was extensively discussed. The potential risks of bleeding, infection, neural or vascular injury to the spermatic cord, recurrent hernia  and chronic pain were all reviewed. He was given a stated chance of symptomatic relief with surgery was likely 50% at best. With a full and thorough understanding of the surgery, its potential risks, and anticipated outcomes he wished to proceed.   DESCRIPTION OF PROCEDURE: The patient was taken to the operating suite and uneventfully endotracheally intubated. Vazquez catheter was placed using aseptic technique for bladder decompression. The abdomen and groin were prepped and draped in a sterile fashion. Surgeon initiated timeout was acknowledged. We made a curvilinear incision at the underside of the umbilicus and took this down through skin and subcutaneous tissue. We dissected down until the anterior rectus sheath was encountered. We incised along the fascia such that we were looking at the  rectus muscle directly. The rectus muscle was retracted laterally and we inserted our dissecting balloon along the posterior rectus sheath toward the pubic bone. Once this was in place, we removed the obturator and inserted our camera. We then instilled air into the dissecting balloon and under direct visualization created our preperitoneal space. Dissecting balloon was then removed and our working balloon was placed and positioned. Two 5 mm trocars were placed along the lower midline under direct laparoscopic visualization. We began our dissection on the right side. Using combination of sharp and blunt dissection, we created a plane behind the abdominal wall and the underlying peritoneum. This was done in a blunt and atraumatic fashion. The inferior epigastric vessels were visualized running along the underside of the abdominal wall. We followed the epigastric vessels down until we were able to identify the internal ring. The spermatic cord was skeletonized. The dissection was carried far lateral to the transverse abdominis muscle layer. I thoroughly dissected out the symphysis as well as Osbaldo's ligament. We then placed a Progrip mesh within this space. Once we were satisfied with our position, we turned our attention toward the other side.   Using a combination of sharp and blunt dissection, we were able to dissect out the spermatic cord. Overall an identical dissection was performed on the left-hand side. Once the space was adequately dissected and the spermatic cord had been adequately skeletonized. Once we were satisfied with the space that we had, we deployed another Progrip hernia mesh. With the separate pieces of mesh positioned they overlapped in the midline providing adequate coverage around the area of the symphysis.The mesh was held in place and the insufflation was released. The mesh was held in excellent position under direct visualization until the insufflation was completely out of the  preperitoneal space. We then removed the 5 mm working ports under direct visualization. The anterior fascial slit was closed with an 0 Vicryl suture.  Local anesthetic was injected in the surgical sites.  The skin incisions were closed with 4-0 Vicryl suture.  Benzoin and Steri-Strips were applied.  The patient tolerated this without difficulty.  Patient was awakened in the operating room and transferred to the recovery room in a stable condition.   ESTIMATED BLOOD LOSS: 10cc   Maycol Olmedo MD, MD             No

## 2021-05-30 ENCOUNTER — RECORDS - HEALTHEAST (OUTPATIENT)
Dept: ADMINISTRATIVE | Facility: CLINIC | Age: 26
End: 2021-05-30

## 2021-05-31 VITALS — BODY MASS INDEX: 20.98 KG/M2 | WEIGHT: 163.5 LBS | HEIGHT: 74 IN

## 2021-05-31 VITALS — HEIGHT: 74 IN | WEIGHT: 169 LBS | BODY MASS INDEX: 21.69 KG/M2

## 2021-06-07 NOTE — TELEPHONE ENCOUNTER
"Pt's girlfriend is calling in for pt. Pt gave permission to speak with her. Pt has had a moderate amount of bright red bloody diarrhea 10 times today since 2am. Pt reports it is only when having a stool. The last one about an hour ago was more of a bloody mucus than stool. Pt reports nausea and abdominal cramping on and off, rating that \"6\". Pt denies any vomiting, or fever. Pt admits to drinking a lot of fluids.   Pt has not traveled internationally or to a high risk area in the past month, and has not been exposed to anyone known to be positive for the Coronavirus. Pt denies cough, or shortness of breath. Advised pt to call back if he has further questions or concerns.    Per protocol pt needs to be evaluated in the ER. Pt agrees with plan, and girlfriend will drive him. [place] was notified of his arrival.     Rajesh Chandra RN [facility] Triage/Medication Refill    Reason for Disposition    Bloody, black, or tarry bowel movements    Protocols used: DIARRHEA-A-OH      "

## 2021-06-11 NOTE — ANESTHESIA CARE TRANSFER NOTE
Last vitals:   Vitals:    06/25/17 2116   BP: 125/58   Pulse: 76   Resp: 12   Temp: 36.3  C (97.3  F)   SpO2: 100%     Patient's level of consciousness is awake and drowsy  Spontaneous respirations: yes  Maintains airway independently: yes  Dentition unchanged: yes  Oropharynx: oropharynx clear of all foreign objects    QCDR Measures:  ASA# 20 - Surgical Safety Checklist: ASA20A - Safety Checks Done  PQRS# 430 - Adult PONV Prevention: 4558F - Pt received => 2 anti-emetic agents (different classes) preop & intraop  ASA# 8 - Peds PONV Prevention: NA - Not pediatric patient, not GA or 2 or more risk factors NOT present  PQRS# 424 - Thao-op Temp Management: 4559F - At least one body temp DOCUMENTED => 35.5C or 95.9F within required timeframe  PQRS# 426 - PACU Transfer Protocol: - Transfer of care checklist used  ASA# 14 - Acute Post-op Pain: ASA14B - Patient did NOT experience pain >= 7 out of 10

## 2021-06-11 NOTE — ANESTHESIA POSTPROCEDURE EVALUATION
Patient: Delvis Owusu  APPENDECTOMY, LAPAROSCOPIC  Anesthesia type: general    Patient location: PACU  Last vitals:   Vitals:    06/25/17 2255   BP: 117/54   Pulse: 60   Resp: 16   Temp: 36.6  C (97.8  F)   SpO2: 96%     Post vital signs: stable  Level of consciousness: awake and responds to simple questions  Post-anesthesia pain: pain controlled  Post-anesthesia nausea and vomiting: no  Pulmonary: unassisted, return to baseline  Cardiovascular: stable and blood pressure at baseline  Hydration: adequate  Anesthetic events: no    QCDR Measures:  ASA# 11 - Thao-op Cardiac Arrest: ASA11B - Patient did NOT experience unanticipated cardiac arrest  ASA# 12 - Thao-op Mortality Rate: ASA12B - Patient did NOT die  ASA# 13 - PACU Re-Intubation Rate: ASA13B - Patient did NOT require a new airway mgmt  ASA# 10 - Composite Anes Safety: ASA10A - No serious adverse event  ASA# 38 - New Corneal Injury: ASA38A - No new exposure keratitis or corneal abrasion in PACU       Additional Notes:

## 2021-06-11 NOTE — ANESTHESIA PREPROCEDURE EVALUATION
Anesthesia Evaluation      Patient summary reviewed   No history of anesthetic complications     Airway   Mallampati: II  Neck ROM: full   Pulmonary - normal exam   (+) asthma  mild,  well controlled, a smoker  (-) pneumonia, COPD, shortness of breath, recent URI, sleep apnea                         Cardiovascular - negative ROS and normal exam   Neuro/Psych - negative ROS     Endo/Other - negative ROS      GI/Hepatic/Renal - negative ROS      Other findings: There is no known family history of malignant hyperthermia. R/O Acute Appendicitis.      Dental - normal exam                        Anesthesia Plan  Planned anesthetic: general endotracheal    ASA 2 - emergent   Induction: intravenous   Anesthetic plan and risks discussed with: patient  Anesthesia plan special considerations: rapid sequence induction,   Post-op plan: routine recovery

## 2021-06-12 NOTE — PROGRESS NOTES
Delvis is scheduled for a colonoscopy with Dr. Andersen on 9/15/17 at [facility]. Patient was given instructions of arrival time, and need a . I emailed the colonoscopy prep to Delvis at his personal email.  Patient verbalized understanding.     Daria Young [facility]  Physician    [facility]   [phone]

## 2021-06-12 NOTE — PROGRESS NOTES
Delvis is re-scheduled for a colonoscopy with Dr. Andersen on 10/13/17 at [facility]. Patient was given instructions of arrival time, need a , pre-op physical within 30days and NPO after midnight. Patient verbalized understanding.     Daria Young [facility]  Physician    [facility]   [phone]

## 2021-06-13 NOTE — PROGRESS NOTES
" HPI: Delvis Owusu is here for follow up after recent discharge from the hospital for continued right lower quadrant cecal inflammation.  He is doing well now with no further right lower quadrant pain.  However, he is now complaining of suprapubic discomfort which he states started after his lap scopic appendectomy.  He feels a fullness in the area but denies any urinary tract complaints.  Denies any abdominal bulges but states that he has an annoying pain in the suprapubic region that is progressively increasing in severity.  He also notes that some of the pain apparently radiates to the back and then notices that he has bilateral thigh pain.  He denies any numbness or tingling or difficulty walking.  He does play a fair amount of golf.    Allergies, Medications, Social History, Past Medical History and Past Surgical History were reviewed and are noted in the chart.    /67 (Patient Site: Right Arm, Patient Position: Sitting, Cuff Size: Adult Regular)  Pulse 88  Ht 6' 2\" (1.88 m)  Wt 163 lb 8 oz (74.2 kg)  SpO2 98%  BMI 20.99 kg/m2  Body mass index is 20.99 kg/(m^2).      EXAM:   GENERAL: Appears well  Abdomen-soft, nontender, mild tenderness to palpation over the pubic symphysis, no evidence of hernias in the inguinal region on the left or right.       Assessment/Plan: Delvis Owusu does not have any further right lower quadrant pain.  The plan will be for colonoscopy next week.  With regards to his suprapubic pain, leg pain and back pain he may have inflammation of the pubic symphysis versus back pain which is radiating to the pubic symphysis in the legs.  I will order an MRI of the back at this point and he may need to follow-up with an orthopedic surgeon if the colonoscopy is normal.    Sahil Andersen  DO [facility] Department of Surgery  "

## 2021-10-24 ENCOUNTER — HEALTH MAINTENANCE LETTER (OUTPATIENT)
Age: 26
End: 2021-10-24

## 2022-02-13 ENCOUNTER — HEALTH MAINTENANCE LETTER (OUTPATIENT)
Age: 27
End: 2022-02-13

## 2022-10-15 ENCOUNTER — HEALTH MAINTENANCE LETTER (OUTPATIENT)
Age: 27
End: 2022-10-15

## 2023-03-26 ENCOUNTER — HEALTH MAINTENANCE LETTER (OUTPATIENT)
Age: 28
End: 2023-03-26

## (undated) DEVICE — ENDO TROCAR FIRST ENTRY KII FIOS ADV FIX 05X75MM CFF05

## (undated) DEVICE — ENDO TROCAR SLEEVE KII ADV FIXATION 05X75MM CFS03

## (undated) DEVICE — BLADE CLIPPER 4406

## (undated) DEVICE — PACK LAP CHOLE SLC15LCFSD

## (undated) DEVICE — LINEN TOWEL PACK X5 5464

## (undated) DEVICE — SU VICRYL 4-0 PS-2 18" UND J496H

## (undated) DEVICE — SOL WATER IRRIG 1000ML BOTTLE 2F7114

## (undated) DEVICE — GOWN IMPERVIOUS SPECIALTY XLG/XLONG 32474

## (undated) DEVICE — GLOVE PROTEXIS W/NEU-THERA 8.0  2D73TE80

## (undated) DEVICE — ENDO TROCAR DISSECTING BALLOON OMS-PDBS2

## (undated) DEVICE — SYSTEM CLEARIFY VISUALIZATION 21-345

## (undated) DEVICE — ENDO TROCAR BALLOON TIP 10MM  OMS-T10SB

## (undated) DEVICE — SU VICRYL 0 UR-6 27" J603H

## (undated) DEVICE — SOL NACL 0.9% IRRIG 1000ML BOTTLE 07138-09

## (undated) DEVICE — PREP CHLORAPREP 26ML TINTED ORANGE  260815

## (undated) DEVICE — CATH TRAY FOLEY COUDE 16FR SILVER W/URINE METER 350ML 304716

## (undated) RX ORDER — KETOROLAC TROMETHAMINE 30 MG/ML
INJECTION, SOLUTION INTRAMUSCULAR; INTRAVENOUS
Status: DISPENSED
Start: 2019-01-02

## (undated) RX ORDER — HYDROMORPHONE HYDROCHLORIDE 1 MG/ML
INJECTION, SOLUTION INTRAMUSCULAR; INTRAVENOUS; SUBCUTANEOUS
Status: DISPENSED
Start: 2019-01-02

## (undated) RX ORDER — LIDOCAINE HYDROCHLORIDE 20 MG/ML
INJECTION, SOLUTION EPIDURAL; INFILTRATION; INTRACAUDAL; PERINEURAL
Status: DISPENSED
Start: 2019-01-02

## (undated) RX ORDER — ONDANSETRON 2 MG/ML
INJECTION INTRAMUSCULAR; INTRAVENOUS
Status: DISPENSED
Start: 2019-01-02

## (undated) RX ORDER — CEFAZOLIN SODIUM 2 G/100ML
INJECTION, SOLUTION INTRAVENOUS
Status: DISPENSED
Start: 2019-01-02

## (undated) RX ORDER — HYDROCODONE BITARTRATE AND ACETAMINOPHEN 5; 325 MG/1; MG/1
TABLET ORAL
Status: DISPENSED
Start: 2019-01-02

## (undated) RX ORDER — NEOSTIGMINE METHYLSULFATE 1 MG/ML
VIAL (ML) INJECTION
Status: DISPENSED
Start: 2019-01-02

## (undated) RX ORDER — FENTANYL CITRATE 50 UG/ML
INJECTION, SOLUTION INTRAMUSCULAR; INTRAVENOUS
Status: DISPENSED
Start: 2019-01-02

## (undated) RX ORDER — PROPOFOL 10 MG/ML
INJECTION, EMULSION INTRAVENOUS
Status: DISPENSED
Start: 2019-01-02

## (undated) RX ORDER — HYDROXYZINE HYDROCHLORIDE 50 MG/ML
INJECTION, SOLUTION INTRAMUSCULAR
Status: DISPENSED
Start: 2019-01-02

## (undated) RX ORDER — GLYCOPYRROLATE 0.2 MG/ML
INJECTION, SOLUTION INTRAMUSCULAR; INTRAVENOUS
Status: DISPENSED
Start: 2019-01-02

## (undated) RX ORDER — DEXAMETHASONE SODIUM PHOSPHATE 4 MG/ML
INJECTION, SOLUTION INTRA-ARTICULAR; INTRALESIONAL; INTRAMUSCULAR; INTRAVENOUS; SOFT TISSUE
Status: DISPENSED
Start: 2019-01-02